# Patient Record
Sex: FEMALE | Race: WHITE | NOT HISPANIC OR LATINO | Employment: UNEMPLOYED | URBAN - METROPOLITAN AREA
[De-identification: names, ages, dates, MRNs, and addresses within clinical notes are randomized per-mention and may not be internally consistent; named-entity substitution may affect disease eponyms.]

---

## 2023-03-03 LAB
HSV 1 PCR QUAL, SKIN/MUCOSA: NOT DETECTED
HSV 2 PCR QUAL, SKIN/MUCOSA: NOT DETECTED
VARICELLA ZOSTER VIRUS PCR SKIN/MUCOSA: NOT DETECTED

## 2023-12-09 PROBLEM — F43.9 TRAUMA AND STRESSOR-RELATED DISORDER: Status: ACTIVE | Noted: 2023-12-09

## 2023-12-09 PROBLEM — R55 NEAR SYNCOPE: Status: ACTIVE | Noted: 2023-12-09

## 2023-12-09 PROBLEM — D18.01 HEMANGIOMA OF SKIN AND SUBCUTANEOUS TISSUE: Status: ACTIVE | Noted: 2023-03-24

## 2023-12-09 PROBLEM — G90.A POTS (POSTURAL ORTHOSTATIC TACHYCARDIA SYNDROME): Status: ACTIVE | Noted: 2023-12-09

## 2023-12-09 PROBLEM — F32.A DEPRESSION: Status: ACTIVE | Noted: 2023-12-09

## 2023-12-09 PROBLEM — H54.3 VISUAL LOSS, BILATERAL: Status: ACTIVE | Noted: 2023-12-09

## 2023-12-09 PROBLEM — R60.9 EDEMA: Status: ACTIVE | Noted: 2023-12-09

## 2023-12-09 PROBLEM — K58.9 IBS (IRRITABLE BOWEL SYNDROME): Status: ACTIVE | Noted: 2023-12-09

## 2023-12-09 PROBLEM — R00.2 PALPITATIONS: Status: ACTIVE | Noted: 2023-12-09

## 2023-12-09 PROBLEM — Q79.60 EDS (EHLERS-DANLOS SYNDROME) (HHS-HCC): Status: ACTIVE | Noted: 2023-12-09

## 2023-12-09 PROBLEM — G43.909 MIGRAINE, UNSPECIFIED, NOT INTRACTABLE, WITHOUT STATUS MIGRAINOSUS: Status: ACTIVE | Noted: 2023-12-09

## 2023-12-09 PROBLEM — L21.8 OTHER SEBORRHEIC DERMATITIS: Status: ACTIVE | Noted: 2023-03-24

## 2023-12-09 PROBLEM — D48.5 NEOPLASM OF UNCERTAIN BEHAVIOR OF SKIN: Status: ACTIVE | Noted: 2023-03-24

## 2023-12-09 PROBLEM — D22.39 MELANOCYTIC NEVI OF OTHER PARTS OF FACE: Status: ACTIVE | Noted: 2023-03-24

## 2023-12-09 PROBLEM — F12.90 USE OF CANNABIS: Status: ACTIVE | Noted: 2023-12-09

## 2023-12-09 PROBLEM — I95.1 ORTHOSTATIC INTOLERANCE: Status: ACTIVE | Noted: 2023-12-09

## 2023-12-09 PROBLEM — R10.2 PELVIC PAIN IN FEMALE: Status: ACTIVE | Noted: 2023-12-09

## 2023-12-09 PROBLEM — R42 DIZZINESS: Status: ACTIVE | Noted: 2023-12-09

## 2023-12-09 PROBLEM — D22.5 MELANOCYTIC NEVI OF TRUNK: Status: ACTIVE | Noted: 2023-03-24

## 2023-12-09 PROBLEM — M25.50 ARTHRALGIA OF MULTIPLE SITES: Status: ACTIVE | Noted: 2023-12-09

## 2023-12-09 PROBLEM — B00.9 HERPESVIRAL INFECTION, UNSPECIFIED: Status: ACTIVE | Noted: 2023-03-24

## 2023-12-09 PROBLEM — L71.0 PERIORAL DERMATITIS: Status: ACTIVE | Noted: 2023-03-24

## 2023-12-09 PROBLEM — G44.89 FOOD SENSITIVITY HEADACHE: Status: ACTIVE | Noted: 2023-12-09

## 2023-12-09 PROBLEM — Z91.018 FOOD ALLERGY: Status: ACTIVE | Noted: 2023-12-09

## 2023-12-09 PROBLEM — F41.9 ANXIETY: Status: ACTIVE | Noted: 2023-12-09

## 2023-12-09 PROBLEM — T78.1XXA GASTROINTESTINAL FOOD SENSITIVITY: Status: ACTIVE | Noted: 2023-12-09

## 2023-12-09 PROBLEM — F43.10 PTSD (POST-TRAUMATIC STRESS DISORDER): Status: ACTIVE | Noted: 2023-12-09

## 2023-12-09 PROBLEM — L57.8 OTHER SKIN CHANGES DUE TO CHRONIC EXPOSURE TO NONIONIZING RADIATION: Status: ACTIVE | Noted: 2023-03-24

## 2023-12-09 PROBLEM — N91.2 AMENORRHEA: Status: ACTIVE | Noted: 2023-12-09

## 2023-12-09 RX ORDER — ARIPIPRAZOLE ORAL 1 MG/ML
SOLUTION ORAL
COMMUNITY
Start: 2023-03-28 | End: 2023-12-18 | Stop reason: SDUPTHER

## 2023-12-18 ENCOUNTER — TELEMEDICINE (OUTPATIENT)
Dept: BEHAVIORAL HEALTH | Facility: CLINIC | Age: 33
End: 2023-12-18
Payer: COMMERCIAL

## 2023-12-18 DIAGNOSIS — F41.8 ANXIETY ASSOCIATED WITH DEPRESSION: ICD-10-CM

## 2023-12-18 DIAGNOSIS — F33.9 RECURRENT DEPRESSION (CMS-HCC): ICD-10-CM

## 2023-12-18 PROCEDURE — 99214 OFFICE O/P EST MOD 30 MIN: CPT

## 2023-12-18 RX ORDER — BUPROPION HYDROCHLORIDE 75 MG/1
75 TABLET ORAL DAILY
Qty: 90 TABLET | Refills: 3 | Status: SHIPPED | OUTPATIENT
Start: 2023-12-18 | End: 2024-02-20 | Stop reason: SDUPTHER

## 2023-12-18 RX ORDER — ARIPIPRAZOLE ORAL 1 MG/ML
5 SOLUTION ORAL DAILY
Qty: 450 ML | Refills: 3 | Status: SHIPPED | OUTPATIENT
Start: 2023-12-18 | End: 2024-02-20 | Stop reason: SDUPTHER

## 2023-12-18 NOTE — PROGRESS NOTES
Subjective   Patient ID: Reyes Win is a 33 y.o. female with past medical history of food and latex allergies, POTS, Yecenia-Danlos syndrome, and IBS , presenting today for follow up appointment for anxiety, depression, and trauma.     Chief Complain - Follow up     Patient provided 2 personal identifiers today prior to virtual appointment.    Not much going on today. Patient keeps busy with her children, two of whom are in school and Jg is at home with her.     Patient's  is ill today with a fever and vomiting. She is hoping that the family stays well.     Patient wasn't able to start Gabapentin due to issues with her compounding pharmacy but she reports that she is doing well currently and will hold off starting the medication.  Patient has to have her medication compounded since she is allergic to corn which causes her severe migraines.     Patient completed a TMS over the summer at [facility] in [place] and she reports that the treatment was effective for treating symptoms of depression and anxiety. She reports that the effects have lasted and she is very pleased with her mood and anxiety level. Her insurance covered the treatments and she had 40 treatments, Monday through Friday for 8 weeks. The treatments lasted 30 minutes each day.     Patient denying physical, medical, or mental health complaints today.     Review of Systems    Depressive Symptoms: not depressed or irritable, no loss of interest, no change in appetite, no recent lb weight gain, no recent lb weight loss, no insomnia, no fatigue or loss of energy, not feeling worthless or guilty, normal concentration, ability to make decisions, no suicidal ideation, no guns or weapons in household.   Manic Symptoms: mood is not irritable or elevated, self esteem is not grandiose or increased, no changes in need for sleep, not more talkative than usual, does not have flight of ideas or racing thoughts, no distractibility, no psychomotor  agitation or increased goal-directed activity, no excessive involvement in pleasurable activities.   Psychotic Symptoms: no hallucinations, no delusions, no disorganized speech, does not have disorganized behavior or catatonia, no negative symptoms.   Anxiety Symptoms: not having anxiety symptoms, no excessive worry, no difficulty controlling worry, (+) exposure to traumatic event, restlessness / not feeling on edge due to worry,  no panic attacks, no concerns about future panic attacks, no worry about panic attack consequences, no change in behavior due to panic attacks, no increase in arousal, not easily fatigued due to worry, no difficulty concentrating due to worry, no irritability due to worry, no muscle tension due to worry, no sleep disturbances due to worry, no specific phobia, no social phobia, no obsessions, no compulsions, no re-experiencing of traumatic event, no avoidance of stimuli and number of responsiveness.   Delirium/ Altered Mental Status Symptoms: no disturbances of consciousness, no diminished ability to focus, sustain, shift attention, no change in cognition or perceptual disturbances, symptoms do not fluctuate during the course of the day, general medical condition is not present.   Disordered Eating Symptoms: weight is not less than 85% of ideal body weight.   Post-traumatic stress disorder symptoms The patient is currently experiencing symptoms, emotional numbing, feeling detached, relationship problems, irritability, inability to concentrate.   Inattentive Symptoms: often has difficulty paying attention, often avoids/dislikes tasks with sustained mental effort, but does not make careless mistakes often, not often disorganized, does not lose things often, is not easily distracted, is not often forgetful, listens when spoken to directly, is able to follow instructions and finish schoolwork.   Other Symptoms/ Concerns: impulse control symptoms, personality disorder symptoms.   All other  "systems have been reviewed and are negative for complaint.     Constitutional: no sleep apnea, normal sleeping, no night wakings, no snoring, not a picky eater, normal appetite, no swallowing problems, no night terrors, no nightmares, no restless sleep, no snorts/gasps and no obesity.   Eyes: does not wear glasses/contacts.   ENT: no hearing loss.   Cardiovascular: no chest pain, no palpitations and no syncope . POTS Syndrome.   Respiratory: no wheezing and no asthma/reactive airway disease.   Gastrointestinal: no constipation, no abdominal pain, no nausea, no vomiting, no diarrhea, no blood in stools and no reflux.   Genitourinary: no nocturnal enuresis and no incontinence.   Musculoskeletal: myalgias and normal gait, but moving all extremities well and symmetrical, no arthritis or joint problems and no muscle weakness.   Neurological: headache, but no head injury, no seizures, no staring spells and no loss of consciousness.   Endocrine: no temperature intolerance.   Hematologic/Lymphatic: no anemia and no lead poisoning.   All other systems have been reviewed and are negative for complaint.     Mental Status Exam    General: Appropriately groomed and dressed, good eye contact  Appearance: Appears stated age   Attitude: Calm, cooperative   Behavior: Appropriate eye contact, attentive, engaged  Motor Activity: gait not assessed - virtual, no tics or tremors, no EPS/TD, no muscle atrophy.   Speech: Regular rate, rhythm, volume and tone, spontaneous, fluent.   Mood: \"good \".   Affect: Euthymic .   Thought Process: Organized, linear, goal directed. Associations are logical.   Thought Content: Does not endorse suicidal or homicidal ideation, no delusions elicited.   Thought Perception: Does not endorse auditory or visual hallucinations, does not appear to be responding to hallucinatory stimuli.   Cognition: Alert, oriented x3. No deficits noted. Adequate fund of knowledge. No deficit in recent and remote memory. No " deficits in attention, concentration or language.    Insight: Good, as patient recognizes symptoms of illness and need for recommended treatments.   Judgment: Can make reasonable decisions about ordinary activities of daily living and necessary medical care recommendations.         Lab Review:   No visits with results within 6 Month(s) from this visit.   Latest known visit with results is:   Orders Only on 03/03/2023   Component Date Value    Herpes simplex virus 1 P* 03/03/2023 NOT DETECTED     Herpes simplex virus 2 P* 03/03/2023 NOT DETECTED        Assessment/Plan   Safety Assessment: no current (+) past SI, (+) SA, no guns. RF include impulsivity, history of self-harm and SA, , pain, depression, chronic illness PF include children, future focused, hopeful. moderate risk risk    Diagnoses and all orders for this visit: Patient looks great today, her symptoms have improved markedly with TMS. Patient affect is bright and she is future focused. She is denying any mental health symptoms. Will continue current medications. No labs need to be ordered today.    Greater than 50% of today's appointment consisted of supportive therapy, counseling, psych-education, and care coordination .   Anxiety associated with depression  Recurrent depression (CMS/HCC)  -     buPROPion (Wellbutrin) 75 mg tablet; Take 1 tablet (75 mg) by mouth once daily.  -     ARIPiprazole (Abilify) 1 mg/mL solution; Take 5 mL (5 mg) by mouth once daily.    Follow up in 3 months     Time     2 minutes chart review  20 minutes direct patient contact  10 minutes charting     Total Time 32 minutes

## 2023-12-21 ENCOUNTER — OFFICE VISIT (OUTPATIENT)
Dept: PRIMARY CARE | Facility: CLINIC | Age: 33
End: 2023-12-21
Payer: COMMERCIAL

## 2023-12-21 VITALS
DIASTOLIC BLOOD PRESSURE: 76 MMHG | WEIGHT: 142.6 LBS | HEIGHT: 69 IN | SYSTOLIC BLOOD PRESSURE: 112 MMHG | BODY MASS INDEX: 21.12 KG/M2 | HEART RATE: 67 BPM

## 2023-12-21 DIAGNOSIS — R53.82 CHRONIC FATIGUE: Primary | ICD-10-CM

## 2023-12-21 LAB
25(OH)D3 SERPL-MCNC: 18 NG/ML (ref 30–100)
ALBUMIN SERPL BCP-MCNC: 4.5 G/DL (ref 3.4–5)
ALP SERPL-CCNC: 58 U/L (ref 33–110)
ALT SERPL W P-5'-P-CCNC: 11 U/L (ref 7–45)
ANION GAP SERPL CALC-SCNC: 14 MMOL/L (ref 10–20)
AST SERPL W P-5'-P-CCNC: 15 U/L (ref 9–39)
BILIRUB SERPL-MCNC: 0.4 MG/DL (ref 0–1.2)
BUN SERPL-MCNC: 11 MG/DL (ref 6–23)
CALCIUM SERPL-MCNC: 10.1 MG/DL (ref 8.6–10.6)
CHLORIDE SERPL-SCNC: 104 MMOL/L (ref 98–107)
CO2 SERPL-SCNC: 29 MMOL/L (ref 21–32)
CREAT SERPL-MCNC: 0.68 MG/DL (ref 0.5–1.05)
FERRITIN SERPL-MCNC: 37 NG/ML (ref 8–150)
GFR SERPL CREATININE-BSD FRML MDRD: >90 ML/MIN/1.73M*2
GLUCOSE SERPL-MCNC: 87 MG/DL (ref 74–99)
IRON SATN MFR SERPL: 33 % (ref 25–45)
IRON SERPL-MCNC: 106 UG/DL (ref 35–150)
POTASSIUM SERPL-SCNC: 4.1 MMOL/L (ref 3.5–5.3)
PROT SERPL-MCNC: 6.9 G/DL (ref 6.4–8.2)
SODIUM SERPL-SCNC: 143 MMOL/L (ref 136–145)
TIBC SERPL-MCNC: 320 UG/DL (ref 240–445)
TRANSFERRIN SERPL-MCNC: 234 MG/DL (ref 200–360)
TSH SERPL-ACNC: 0.93 MIU/L (ref 0.44–3.98)
UIBC SERPL-MCNC: 214 UG/DL (ref 110–370)
VIT B12 SERPL-MCNC: 391 PG/ML (ref 211–911)

## 2023-12-21 PROCEDURE — 1036F TOBACCO NON-USER: CPT | Performed by: NURSE PRACTITIONER

## 2023-12-21 PROCEDURE — 84466 ASSAY OF TRANSFERRIN: CPT

## 2023-12-21 PROCEDURE — 36415 COLL VENOUS BLD VENIPUNCTURE: CPT

## 2023-12-21 PROCEDURE — 80053 COMPREHEN METABOLIC PANEL: CPT

## 2023-12-21 PROCEDURE — 83550 IRON BINDING TEST: CPT

## 2023-12-21 PROCEDURE — 83540 ASSAY OF IRON: CPT

## 2023-12-21 PROCEDURE — 82607 VITAMIN B-12: CPT

## 2023-12-21 PROCEDURE — 84443 ASSAY THYROID STIM HORMONE: CPT

## 2023-12-21 PROCEDURE — 82306 VITAMIN D 25 HYDROXY: CPT

## 2023-12-21 PROCEDURE — 82728 ASSAY OF FERRITIN: CPT

## 2023-12-21 PROCEDURE — 99395 PREV VISIT EST AGE 18-39: CPT | Performed by: NURSE PRACTITIONER

## 2023-12-21 RX ORDER — MULTIVIT WITH MINERALS/HERBS
1 TABLET ORAL DAILY
COMMUNITY

## 2023-12-21 NOTE — PROGRESS NOTES
Reyes Dawson is a 33 y.o. female who is presenting for annual physical exam.     Last  Visit: Unknown  Reported Health: Fair    Dental, Vision, Hearing:  Regular dental visits: yes  - Brushes teeth 2 times per day  - Flosses? yes  Vision problems: yes  - Wears glasses or contacts? yes, glasses but does not wear  - Last eye exam:  2 years ago  Hearing loss: no    Immunization status:  Up to date: no    Lifestyle:  Healthy diet: yes  Regular exercise: yes  - Exercise frequency: 2 times per week  - Type of exercise: weights, cardio  Alcohol: no  Tobacco: no  Drugs: marijuana     Reproductive Health:  Menstrual problems: no  - LMP:  23  Sexually active: yes, 1 male parter  Contraception: tubal     Pregnancy History:   : 4  Parity: 3  - Full term: 3  - Premature:  - (s):  - Living:  - AB Induced:  - AB Spont: 1  - Ectopic:   - Multiple births:    Cervical Cancer Screening:  Last pap:    History of abnormal pap smear? no  Breast Cancer Screening:  Last mammogram:  n/a  Colorectal Cancer Screening:  Last colonoscopy or Cologuard:  n/a  Metabolic Screening:  Lipid profile: 2022  Glucose screen:     Review of Systems  Gen: fatigue. denies fever, chills, weight loss  HEENT: denies sinus pressure, sinus congestion, runny nose, red eyes, itchy eyes, vision loss, ear pain, hearing loss, throat pain, trouble swallowing  Neck: denies neck pain, neck swelling or masses  Chest/breast: denies breast pain, breast lumps, nipple discharge  CV: denies chest pain, palpitations, fast heart rate, syncope  Resp: denies shortness of breath, cough, wheezing  GI: hemorrhoids. denies abdominal pain, nausea, diarrhea, constipation, hematochezia, melena  : denies dysuria, hematuria, vaginal discharge, frequency  Endo: denies polydipsia, polyuria, heat/cold intolerance, weight change, hair thinning  Heme: denies easy bruising, easy bleeding  Neuro: chronic headaches. Chronic dizziness. denies numbness,  tingling, memory loss, changes in vision  MSK: denies joint pain, joint swelling, weakness  Psych: depression and anxiety. denies suicidal ideation, homicidal ideation, mood swings  Skin: denies rashes, abnormal lesions, itching, changes in moles     Previous History  Past Medical History:   Diagnosis Date    29 weeks gestation of pregnancy 02/04/2019    29 weeks gestation of pregnancy    36 weeks gestation of pregnancy 03/25/2019    36 weeks gestation of pregnancy    Diseases of the circulatory system complicating pregnancy, unspecified trimester (CODE) 02/04/2019    Pregnancy complicated by cardiac condition, antepartum    Encounter for insertion of intrauterine contraceptive device 05/16/2019    Encounter for insertion of intrauterine contraceptive device (IUD)    Encounter for pregnancy test, result positive 09/27/2018    Positive pregnancy test    Encounter for prophylactic Rho(d) immune globulin 02/04/2019    Need for rhogam due to Rh negative mother    Encounter for screening for infections with a predominantly sexual mode of transmission 01/26/2017    Screen for STD (sexually transmitted disease)    Encounter for screening for infections with a predominantly sexual mode of transmission 09/22/2016    Screen for STD (sexually transmitted disease)    Encounter for supervision of normal pregnancy, unspecified, second trimester 01/10/2019    Second trimester pregnancy    Encounter for supervision of normal pregnancy, unspecified, third trimester 12/17/2020    Third trimester pregnancy    Encounter for supervision of normal pregnancy, unspecified, unspecified trimester 09/20/2018    Pregnancy with uncertain dates    Maternal care for (suspected) hereditary disease in fetus, not applicable or unspecified 03/25/2019    Hereditary disease in family possibly affecting pregnancy, antepartum    Other mental disorders complicating pregnancy, unspecified trimester 03/25/2019    Depression affecting pregnancy, antepartum     Other specified health status 04/16/2015    Known health problems: none    Personal history of other diseases of the female genital tract     Personal history of amenorrhea    Personal history of other mental and behavioral disorders 11/08/2022    History of suicidal ideation     Past Surgical History:   Procedure Laterality Date    OTHER SURGICAL HISTORY  02/25/2014    [facility] Intrauterine Device (IUD) Insertion    OTHER SURGICAL HISTORY  02/25/2014    Dental Surgery    OTHER SURGICAL HISTORY  06/18/2021    Tubal ligation     Social History     Tobacco Use    Smoking status: Never    Smokeless tobacco: Never   Substance Use Topics    Alcohol use: Not Currently    Drug use: Never     Family History   Problem Relation Name Age of Onset    No Known Problems Mother      Diabetes Maternal Grandmother      Fibromyalgia Maternal Grandmother      Hyperlipidemia Maternal Grandmother      Other (tachycardia) Maternal Grandmother      Diabetes Maternal Grandfather      Hyperlipidemia Maternal Grandfather      Kidney disease Paternal Grandfather       Allergies   Allergen Reactions    Shellfish Containing Products Anaphylaxis    Codeine Unknown    Corn Other     Headache Evaluate products for starch, lactose, or riboflavin. IV solutions should be compounded in NS (avoid D5W)    Iodine Unknown    Latex Hives    Milk Containing Products (Dairy) Unknown    Peanut Unknown    Soy Other     nausea    Wheat Other     Gi issues     Current Outpatient Medications   Medication Instructions    ARIPiprazole (ABILIFY) 5 mg, oral, Daily    buPROPion (WELLBUTRIN) 75 mg, oral, Daily       Physical Exam  General: Alert and oriented, in no acute distress. Appears stated age, well-nourished, and well hydrated  HEENT:  - Head: Normocephalic and atraumatic   - Eyes: EOMI, PERRLA  - ENT: Hearing grossly intact. Mucus membranes pink and moist without lesions. Tonsils present without swelling or exudates. Good dentition. TMs  gray  Neck: Supple. No stiffness. No thyromegaly or thyroid nodules  Heart: RRR. No murmurs, clicks, or rubs  Lungs: Unlabored breathing. CTAB with no crackles, wheezes, or rhonchi  Abdomen: Normal BS in all 4 quadrants. Soft, non-tender, non-distended, with no masses  Extremities: Warm and well perfused. No edema. Normal peripheral pulses  Musculoskeletal: ROM intact. Strength 5/5 in BUE and BLE. No joint swelling. Normal gait and station  Neurological: Alert and oriented. No gross neurological deficits. Normal sensation. No weakness. DTRs +2/4   Psychological: Appropriate mood and affect  Skin: No rash, abnormal lesions, cyanosis, or erythema      Assessment and Plan     Annual Physical  - Due for flu, will need to get at pharmacy due to latex allergy  - Labs: CBCd, iron studies, vit D, vit B12, TSH, CMP  - Declined STI screening  - Maintain healthy lifestyle    RTC in 1 year for physical or sooner BRIDGETTE Solorio-CNP  [facility]

## 2023-12-22 ENCOUNTER — TELEPHONE (OUTPATIENT)
Dept: PRIMARY CARE | Facility: CLINIC | Age: 33
End: 2023-12-22
Payer: COMMERCIAL

## 2023-12-22 DIAGNOSIS — E55.9 VITAMIN D DEFICIENCY: Primary | ICD-10-CM

## 2023-12-22 RX ORDER — ERGOCALCIFEROL 1.25 MG/1
50000 CAPSULE ORAL
Qty: 12 CAPSULE | Refills: 0 | Status: SHIPPED | OUTPATIENT
Start: 2023-12-22 | End: 2023-12-28 | Stop reason: SDUPTHER

## 2023-12-28 ENCOUNTER — TELEPHONE (OUTPATIENT)
Dept: PRIMARY CARE | Facility: CLINIC | Age: 33
End: 2023-12-28

## 2023-12-28 ENCOUNTER — CLINICAL SUPPORT (OUTPATIENT)
Dept: PRIMARY CARE | Facility: CLINIC | Age: 33
End: 2023-12-28
Payer: COMMERCIAL

## 2023-12-28 DIAGNOSIS — R53.82 CHRONIC FATIGUE: ICD-10-CM

## 2023-12-28 DIAGNOSIS — E55.9 VITAMIN D DEFICIENCY: ICD-10-CM

## 2023-12-28 LAB
BASOPHILS # BLD AUTO: 0.04 X10*3/UL (ref 0–0.1)
BASOPHILS NFR BLD AUTO: 0.8 %
EOSINOPHIL # BLD AUTO: 0.07 X10*3/UL (ref 0–0.7)
EOSINOPHIL NFR BLD AUTO: 1.4 %
ERYTHROCYTE [DISTWIDTH] IN BLOOD BY AUTOMATED COUNT: 12.4 % (ref 11.5–14.5)
HCT VFR BLD AUTO: 43.3 % (ref 36–46)
HGB BLD-MCNC: 13.9 G/DL (ref 12–16)
IMM GRANULOCYTES # BLD AUTO: 0.01 X10*3/UL (ref 0–0.7)
IMM GRANULOCYTES NFR BLD AUTO: 0.2 % (ref 0–0.9)
LYMPHOCYTES # BLD AUTO: 1.97 X10*3/UL (ref 1.2–4.8)
LYMPHOCYTES NFR BLD AUTO: 39.9 %
MCH RBC QN AUTO: 30.5 PG (ref 26–34)
MCHC RBC AUTO-ENTMCNC: 32.1 G/DL (ref 32–36)
MCV RBC AUTO: 95 FL (ref 80–100)
MONOCYTES # BLD AUTO: 0.38 X10*3/UL (ref 0.1–1)
MONOCYTES NFR BLD AUTO: 7.7 %
NEUTROPHILS # BLD AUTO: 2.47 X10*3/UL (ref 1.2–7.7)
NEUTROPHILS NFR BLD AUTO: 50 %
NRBC BLD-RTO: 0 /100 WBCS (ref 0–0)
PLATELET # BLD AUTO: 243 X10*3/UL (ref 150–450)
RBC # BLD AUTO: 4.56 X10*6/UL (ref 4–5.2)
WBC # BLD AUTO: 4.9 X10*3/UL (ref 4.4–11.3)

## 2023-12-28 PROCEDURE — 85025 COMPLETE CBC W/AUTO DIFF WBC: CPT

## 2023-12-28 PROCEDURE — 36415 COLL VENOUS BLD VENIPUNCTURE: CPT

## 2023-12-28 RX ORDER — ERGOCALCIFEROL 1.25 MG/1
50000 CAPSULE ORAL
Qty: 12 CAPSULE | Refills: 0 | Status: SHIPPED | OUTPATIENT
Start: 2023-12-28 | End: 2024-03-21

## 2023-12-28 NOTE — TELEPHONE ENCOUNTER
Patient states her vitamin D was sent to a compound pharmacy. Patient requesting the Rx be called into her local pharmacy [facility]/ [place].

## 2024-01-08 NOTE — PATIENT INSTRUCTIONS
1. Reviewed diagnostic impression including subjective and objective data and provided education about anxiety disorder depression and PTSD , etiology, treatment recommendations including medication, therapy, course of treatment and prognosis. Patient amendable to treatment plan.     2. Continue therapy. You are doing well, no need to start the Gabapentin, will DC.      Recommendations  CONTINUE : 75 mg Bupropion -= take 1 tablet in the morning daily for depression  CONTINUE 5 ml Abilify - take once daily for mood stabilization (5 ml or 1 mg/ml).     3. Reviewed r/b/a, possible side effects of the medication(s). Client is aware benefit/risks      4. Labs reviewed and discussed     5. Plan for supportive psychotherapy     6. Follow up with physical health providers as scheduled     7. Follow up in 3 months      May follow up sooner if experiences worsening symptoms by calling  Psychiatry at [phone]      Patient verbalized an understanding to call [facility] at [phone] ([facility]), [phone], or [phone]/go to the nearest emergency room if experiences thoughts of harm to self or others.

## 2024-02-20 DIAGNOSIS — F33.9 RECURRENT DEPRESSION (CMS-HCC): ICD-10-CM

## 2024-02-20 RX ORDER — ARIPIPRAZOLE ORAL 1 MG/ML
5 SOLUTION ORAL DAILY
Qty: 450 ML | Refills: 3 | Status: SHIPPED | OUTPATIENT
Start: 2024-02-20 | End: 2025-02-14

## 2024-02-20 RX ORDER — BUPROPION HYDROCHLORIDE 75 MG/1
75 TABLET ORAL DAILY
Qty: 90 TABLET | Refills: 3 | Status: SHIPPED | OUTPATIENT
Start: 2024-02-20 | End: 2025-02-19

## 2024-03-13 ENCOUNTER — OFFICE VISIT (OUTPATIENT)
Dept: DERMATOLOGY | Facility: CLINIC | Age: 34
End: 2024-03-13
Payer: COMMERCIAL

## 2024-03-13 DIAGNOSIS — D18.01 HEMANGIOMA OF SKIN: ICD-10-CM

## 2024-03-13 DIAGNOSIS — D22.72 MELANOCYTIC NEVI OF LEFT LOWER EXTREMITY OR HIP: ICD-10-CM

## 2024-03-13 DIAGNOSIS — L57.8 PHOTOAGING OF SKIN: ICD-10-CM

## 2024-03-13 DIAGNOSIS — D22.5 MELANOCYTIC NEVI OF TRUNK: ICD-10-CM

## 2024-03-13 DIAGNOSIS — D22.71 MELANOCYTIC NEVI OF RIGHT LOWER LIMB, INCLUDING HIP: ICD-10-CM

## 2024-03-13 DIAGNOSIS — L71.0 PERIORAL DERMATITIS: ICD-10-CM

## 2024-03-13 DIAGNOSIS — D22.60 MELANOCYTIC NEVI OF UNSPECIFIED UPPER LIMB, INCLUDING SHOULDER: ICD-10-CM

## 2024-03-13 DIAGNOSIS — L21.9 SEBORRHEIC DERMATITIS: ICD-10-CM

## 2024-03-13 DIAGNOSIS — Z12.83 ENCOUNTER FOR SCREENING FOR MALIGNANT NEOPLASM OF SKIN: Primary | ICD-10-CM

## 2024-03-13 PROCEDURE — 99214 OFFICE O/P EST MOD 30 MIN: CPT | Performed by: DERMATOLOGY

## 2024-03-13 PROCEDURE — 1036F TOBACCO NON-USER: CPT | Performed by: DERMATOLOGY

## 2024-03-13 RX ORDER — MUPIROCIN 20 MG/G
OINTMENT TOPICAL
Qty: 22 G | Refills: 3 | Status: SHIPPED | OUTPATIENT
Start: 2024-03-13

## 2024-03-13 RX ORDER — KETOCONAZOLE 20 MG/ML
SHAMPOO, SUSPENSION TOPICAL
Qty: 120 ML | Refills: 11 | Status: SHIPPED | OUTPATIENT
Start: 2024-03-13

## 2024-03-13 ASSESSMENT — DERMATOLOGY PATIENT ASSESSMENT
ARE YOU AN ORGAN TRANSPLANT RECIPIENT: NO
ARE YOU ON BIRTH CONTROL: NO
HAVE YOU HAD OR DO YOU HAVE VASCULAR DISEASE: NO
ARE YOU TRYING TO GET PREGNANT: NO
DO YOU HAVE ANY NEW OR CHANGING LESIONS: NO
HAVE YOU HAD OR DO YOU HAVE A STAPH INFECTION: NO
DO YOU USE SUNSCREEN: OCCASIONALLY
FOR PATIENTS COMING IN FOR A FOLLOW-UP VISIT - HAVE THERE BEEN ANY CHANGES IN YOUR HEALTH SINCE YOUR LAST VISIT: NO
DO YOU HAVE IRREGULAR MENSTRUAL CYCLES: NO
DO YOU USE A TANNING BED: NO

## 2024-03-13 ASSESSMENT — DERMATOLOGY QUALITY OF LIFE (QOL) ASSESSMENT
RATE HOW BOTHERED YOU ARE BY EFFECTS OF YOUR SKIN PROBLEMS ON YOUR ACTIVITIES (EG, GOING OUT, ACCOMPLISHING WHAT YOU WANT, WORK ACTIVITIES OR YOUR RELATIONSHIPS WITH OTHERS): 0 - NEVER BOTHERED
RATE HOW BOTHERED YOU ARE BY SYMPTOMS OF YOUR SKIN PROBLEM (EG, ITCHING, STINGING BURNING, HURTING OR SKIN IRRITATION): 0 - NEVER BOTHERED
ARE THERE EXCLUSIONS OR EXCEPTIONS FOR THE QUALITY OF LIFE ASSESSMENT: NO
WHAT SINGLE SKIN CONDITION LISTED BELOW IS THE PATIENT ANSWERING THE QUALITY-OF-LIFE ASSESSMENT QUESTIONS ABOUT: NONE OF THE ABOVE
RATE HOW EMOTIONALLY BOTHERED YOU ARE BY YOUR SKIN PROBLEM (FOR EXAMPLE, WORRY, EMBARRASSMENT, FRUSTRATION): 0 - NEVER BOTHERED
DATE THE QUALITY-OF-LIFE ASSESSMENT WAS COMPLETED: 66912

## 2024-03-13 ASSESSMENT — ITCH NUMERIC RATING SCALE: HOW SEVERE IS YOUR ITCHING?: 0

## 2024-03-13 ASSESSMENT — PATIENT GLOBAL ASSESSMENT (PGA): PATIENT GLOBAL ASSESSMENT: PATIENT GLOBAL ASSESSMENT:  1 - CLEAR

## 2024-03-13 NOTE — PROGRESS NOTES
Subjective     Reyes Dawson is a 33 y.o. female who presents for the following: Skin Check (Pt here for FBSE with family hx of NMSC - Mother. Pt reports no areas of concern at this time. ).     Review of Systems:  No other skin or systemic complaints other than what is documented elsewhere in the note.    The following portions of the chart were reviewed this encounter and updated as appropriate:         Skin Cancer History  No skin cancer on file.      Specialty Problems          Dermatology Problems    Hemangioma of skin and subcutaneous tissue    Melanocytic nevi of other parts of face    Melanocytic nevi of trunk    Neoplasm of uncertain behavior of skin    Other seborrheic dermatitis    Other skin changes due to chronic exposure to nonionizing radiation    Perioral dermatitis        Objective   Well appearing patient in no apparent distress; mood and affect are within normal limits.    A full examination was performed including scalp, head, eyes, ears, nose, lips, neck, chest, axillae, abdomen, back, buttocks, bilateral upper extremities, bilateral lower extremities, hands, feet, fingers, toes, fingernails, and toenails. All findings within normal limits unless otherwise noted below.    Assessment/Plan   1. Encounter for screening for malignant neoplasm of skin  No suspicious lesions noted on examination today    The risk of chronic, cumulative sun damage and risk of development of skin cancer was reviewed today.   The importance of sun protection was reviewed: including the use of a broad spectrum sunscreen that protects against both UVA/UVB rays, with ingredients such as Zinc oxide or titanium dioxide, wearing sun protective clothing and sun avoidance. We reviewed the warning signs of non-melanoma skin cancer and ABCDEs of melanoma  Please follow up should you notice any new or changing pre-existing skin lesion.    Related Procedures  Follow Up In Dermatology - Established Patient    2. Photoaging of  skin  Mottled pigmentation with telangiectasias and brown reticular macules in sun exposed areas of the body.    The risk of chronic, cumulative sun damage and risk of development of skin cancer was reviewed today.   The importance of sun protection was reviewed: including the use of a broad spectrum sunscreen that protects against both UVA/UVB rays, with ingredients such as Zinc oxide or titanium dioxide, wearing sun protective clothing and sun avoidance. We reviewed the warning signs of non-melanoma skin cancer and ABCDEs of melanoma  Please follow up should you notice any new or changing pre-existing skin lesion.    Related Procedures  Follow Up In Dermatology - Established Patient    3. Seborrheic dermatitis  Scalp  Clear today    The chronic and intermittently flaring nature of this skin condition was discussed with patient today.   This is due to a yeast that everyone has on their skin that results in redness, dryness and in some patients itching.    Various treatment options were reviewed including topical antifungals and topical steroids depending upon severity and symptoms. Patient advised we cannot cure this condition, but it can be controlled.     Continue ketoconazole 2% shampoo, lather for 3-5 minutes every other day then rinse.    ketoconazole (NIZOral) 2 % shampoo - Scalp  Lather for 3-5 minutes every other day then rinse    4. Perioral dermatitis  Left Upper Cutaneous Lip, Right Upper Cutaneous Lip  Slight erythema today    The chronic and intermittently flaring nature of the skin condition was discussed with the patient today. Discussed common triggers associated with perioral dermatitis including fluoride treatment, cinnamon gum and/or mints, inhaled or oral corticosteroids. Various treatment options discussed and risks and benefits of each.   Patient does well with mupirocin ointment and requesting refill today.    If not responding or worsening she was previously on oral doxycycline which was  helpful and resulted in clearance of rash advised she can call for refills.      Related Medications  mupirocin (Bactroban) 2 % ointment  Apply to the face 2x daily as needed for flaring    5. Melanocytic nevi of trunk (2)  Abdomen (Lower Torso, Anterior), Torso - Posterior (Back)  Tan-brown symmetric macules and papules    Clinically benign appearing nevi, no treatment is necessary.  The importance of sun protection was reviewed: including the use of a broad spectrum sunscreen that protects against both UVA/UVB rays, with ingredients such as Zinc oxide or titanium dioxide, wearing sun protective clothing and sun avoidance.   ABCDEs of melanoma reviewed.  Please follow up should you notice any new or changing pre-existing skin lesion.    6. Melanocytic nevi of unspecified upper limb, including shoulder (2)  Left Arm, Right Arm  Scattered, uniform and benign-appearing, regular brown melanocytic papules and macules.    Clinically benign appearing nevi, no treatment is necessary.  The importance of sun protection was reviewed: including the use of a broad spectrum sunscreen that protects against both UVA/UVB rays, with ingredients such as Zinc oxide or titanium dioxide, wearing sun protective clothing and sun avoidance.   ABCDEs of melanoma reviewed.  Please follow up should you notice any new or changing pre-existing skin lesion.    7. Melanocytic nevi of left lower extremity or hip  Left Leg  Scattered, uniform and benign-appearing, regular brown melanocytic papules and macules.    Clinically benign appearing nevi, no treatment is necessary.  The importance of sun protection was reviewed: including the use of a broad spectrum sunscreen that protects against both UVA/UVB rays, with ingredients such as Zinc oxide or titanium dioxide, wearing sun protective clothing and sun avoidance.   ABCDEs of melanoma reviewed.  Please follow up should you notice any new or changing pre-existing skin lesion.    8. Melanocytic nevi  of right lower limb, including hip  Right Leg  Scattered, uniform and benign-appearing, regular brown melanocytic papules and macules.    Clinically benign appearing nevi, no treatment is necessary.  The importance of sun protection was reviewed: including the use of a broad spectrum sunscreen that protects against both UVA/UVB rays, with ingredients such as Zinc oxide or titanium dioxide, wearing sun protective clothing and sun avoidance.   ABCDEs of melanoma reviewed.  Please follow up should you notice any new or changing pre-existing skin lesion.    9. Hemangioma of skin  Cherry red papules    The benign nature of these skin lesions were reviewed, no treatment is necessary.   Please follow up for any new or pre-existing lesion that is changing in size, shape, color, becomes painful, tender, itches or bleed.    Follow up yearly.

## 2024-06-16 ENCOUNTER — APPOINTMENT (OUTPATIENT)
Dept: BEHAVIORAL HEALTH | Facility: CLINIC | Age: 34
End: 2024-06-16
Payer: COMMERCIAL

## 2024-06-16 DIAGNOSIS — F33.9 RECURRENT DEPRESSION (CMS-HCC): ICD-10-CM

## 2024-06-16 DIAGNOSIS — F41.9 ANXIETY: ICD-10-CM

## 2024-06-16 PROCEDURE — 1036F TOBACCO NON-USER: CPT

## 2024-06-16 PROCEDURE — 99214 OFFICE O/P EST MOD 30 MIN: CPT

## 2024-06-16 NOTE — PATIENT INSTRUCTIONS
1. Reviewed diagnostic impression including subjective and objective data and provided education about anxiety disorder depression and PTSD , etiology, treatment recommendations including medication, therapy, course of treatment and prognosis. Patient amendable to treatment plan.     2. Continue therapy. You look great, continue current medications and enjoy the summer      Recommendations  CONTINUE : 75 mg Bupropion -= take 1 tablet in the morning daily for depression  CONTINUE 5 ml Abilify - take once daily for mood stabilization (5 ml or 1 mg/ml).     3. Reviewed r/b/a, possible side effects of the medication(s). Client is aware benefit/risks      4. Labs reviewed and discussed     5. Plan for supportive psychotherapy     6. Follow up with physical health providers as scheduled     7. Follow up in 3 months      May follow up sooner if experiences worsening symptoms by calling  Psychiatry at [phone]      Patient verbalized an understanding to call [facility] at [phone] ([facility]), [phone], or [phone]/go to the nearest emergency room if experiences thoughts of harm to self or others.

## 2024-06-16 NOTE — PROGRESS NOTES
Subjective   Patient ID: Reyes Win is a 33 y.o. female with past medical history of food and latex allergies, POTS, Yecenia-Danlos syndrome, and IBS , presenting today for follow up appointment for anxiety, depression, and trauma.     Chief Complain - Follow up     Patient provided 2 personal identifiers today prior to virtual appointment.    Patient is keeping busy with the kids. They are out of school so they are enjoying their summer.     Oldest son (Raz) is 8 and playing baseball.     Jose is 5 - had her first ballet recital    Youngest is 3 (Jg)    Patient reports that she is doing well on her  medications.      She had a down period and considered having a few TMS sessions but she is feeling OK for now.     Sleeping OK     Appetite is up a bit but no a concern.    HASSAN is bothering her a bit in the  Summer months.     Anxiety is in a good place. No panic attacks.     Review of Systems    Depressive Symptoms: not depressed or irritable, no loss of interest, no change in appetite, no recent lb weight gain, no recent lb weight loss, no insomnia, no fatigue or loss of energy, not feeling worthless or guilty, normal concentration, ability to make decisions, no suicidal ideation, no guns or weapons in household.   Manic Symptoms: mood is not irritable or elevated, self esteem is not grandiose or increased, no changes in need for sleep, not more talkative than usual, does not have flight of ideas or racing thoughts, no distractibility, no psychomotor agitation or increased goal-directed activity, no excessive involvement in pleasurable activities.   Psychotic Symptoms: no hallucinations, no delusions, no disorganized speech, does not have disorganized behavior or catatonia, no negative symptoms.   Anxiety Symptoms: not having anxiety symptoms, no excessive worry, no difficulty controlling worry, (+) exposure to traumatic event, restlessness / not feeling on edge due to worry,  no panic attacks, no  concerns about future panic attacks, no worry about panic attack consequences, no change in behavior due to panic attacks, no increase in arousal, not easily fatigued due to worry, no difficulty concentrating due to worry, no irritability due to worry, no muscle tension due to worry, no sleep disturbances due to worry, no specific phobia, no social phobia, no obsessions, no compulsions, no re-experiencing of traumatic event, no avoidance of stimuli and number of responsiveness.   Delirium/ Altered Mental Status Symptoms: no disturbances of consciousness, no diminished ability to focus, sustain, shift attention, no change in cognition or perceptual disturbances, symptoms do not fluctuate during the course of the day, general medical condition is not present.   Disordered Eating Symptoms: weight is not less than 85% of ideal body weight.   Post-traumatic stress disorder symptoms The patient is currently experiencing symptoms, emotional numbing, feeling detached, relationship problems, irritability, inability to concentrate.   Inattentive Symptoms: often has difficulty paying attention, often avoids/dislikes tasks with sustained mental effort, but does not make careless mistakes often, not often disorganized, does not lose things often, is not easily distracted, is not often forgetful, listens when spoken to directly, is able to follow instructions and finish schoolwork.   Other Symptoms/ Concerns: impulse control symptoms, personality disorder symptoms.   All other systems have been reviewed and are negative for complaint.     Constitutional: no sleep apnea, normal sleeping, no night wakings, no snoring, not a picky eater, normal appetite, no swallowing problems, no night terrors, no nightmares, no restless sleep, no snorts/gasps and no obesity.   Eyes: does not wear glasses/contacts.   ENT: no hearing loss.   Cardiovascular: no chest pain, no palpitations and no syncope . POTS Syndrome.   Respiratory: no wheezing and  "no asthma/reactive airway disease.   Gastrointestinal: no constipation, no abdominal pain, no nausea, no vomiting, no diarrhea, no blood in stools and no reflux.   Genitourinary: no nocturnal enuresis and no incontinence.   Musculoskeletal: myalgias and normal gait, but moving all extremities well and symmetrical, no arthritis or joint problems and no muscle weakness.   Neurological: headache, but no head injury, no seizures, no staring spells and no loss of consciousness.   Endocrine: no temperature intolerance.   Hematologic/Lymphatic: no anemia and no lead poisoning.   All other systems have been reviewed and are negative for complaint.     Mental Status Exam    General: Appropriately groomed and dressed, good eye contact  Appearance: Appears stated age   Attitude: Calm, cooperative   Behavior: Appropriate eye contact, attentive, engaged  Motor Activity: gait not assessed - virtual, no tics or tremors, no EPS/TD, no muscle atrophy.   Speech: Regular rate, rhythm, volume and tone, spontaneous, fluent.   Mood: \"good \".   Affect: Euthymic .   Thought Process: Organized, linear, goal directed. Associations are logical.   Thought Content: Does not endorse suicidal or homicidal ideation, no delusions elicited.   Thought Perception: Does not endorse auditory or visual hallucinations, does not appear to be responding to hallucinatory stimuli.   Cognition: Alert, oriented x3. No deficits noted. Adequate fund of knowledge. No deficit in recent and remote memory. No deficits in attention, concentration or language.    Insight: Good, as patient recognizes symptoms of illness and need for recommended treatments.   Judgment: Can make reasonable decisions about ordinary activities of daily living and necessary medical care recommendations.         Lab Review:   Clinical Support on 12/28/2023   Component Date Value    WBC 12/28/2023 4.9     nRBC 12/28/2023 0.0     RBC 12/28/2023 4.56     Hemoglobin 12/28/2023 13.9     Hematocrit " 12/28/2023 43.3     MCV 12/28/2023 95     MCH 12/28/2023 30.5     MCHC 12/28/2023 32.1     RDW 12/28/2023 12.4     Platelets 12/28/2023 243     Neutrophils % 12/28/2023 50.0     Immature Granulocytes %,* 12/28/2023 0.2     Lymphocytes % 12/28/2023 39.9     Monocytes % 12/28/2023 7.7     Eosinophils % 12/28/2023 1.4     Basophils % 12/28/2023 0.8     Neutrophils Absolute 12/28/2023 2.47     Immature Granulocytes Ab* 12/28/2023 0.01     Lymphocytes Absolute 12/28/2023 1.97     Monocytes Absolute 12/28/2023 0.38     Eosinophils Absolute 12/28/2023 0.07     Basophils Absolute 12/28/2023 0.04    Office Visit on 12/21/2023   Component Date Value    Vitamin D, 25-Hydroxy, T* 12/21/2023 18 (L)     Glucose 12/21/2023 87     Sodium 12/21/2023 143     Potassium 12/21/2023 4.1     Chloride 12/21/2023 104     Bicarbonate 12/21/2023 29     Anion Gap 12/21/2023 14     Urea Nitrogen 12/21/2023 11     Creatinine 12/21/2023 0.68     eGFR 12/21/2023 >90     Calcium 12/21/2023 10.1     Albumin 12/21/2023 4.5     Alkaline Phosphatase 12/21/2023 58     Total Protein 12/21/2023 6.9     AST 12/21/2023 15     Bilirubin, Total 12/21/2023 0.4     ALT 12/21/2023 11     Vitamin B12 12/21/2023 391     Transferrin 12/21/2023 234     Ferritin 12/21/2023 37     Iron 12/21/2023 106     UIBC 12/21/2023 214     TIBC 12/21/2023 320     % Saturation 12/21/2023 33     Thyroid Stimulating Horm* 12/21/2023 0.93        Assessment/Plan   Safety Assessment: no current (+) past SI, (+) SA, no guns. RF include impulsivity, history of self-harm and SA, , pain, depression, chronic illness PF include children, future focused, hopeful. moderate risk risk    Diagnoses and all orders for this visit: Patient looks to look good and she remains at baseline for mood and anxiety. She considered more TMS treatment during a down time but she recovered and didn't need to have it done. No panic attacks, SI, or H.   Doing well, will continue current medications and follow up  in 3 months.     Greater than 50% of today's appointment consisted of supportive therapy, counseling, psych-education, and care coordination .   Anxiety associated with depression  Recurrent depression (CMS/HCC)  -     buPROPion (Wellbutrin) 75 mg tablet; Take 1 tablet (75 mg) by mouth once daily.  -     ARIPiprazole (Abilify) 1 mg/mL solution; Take 5 mL (5 mg) by mouth once daily.    Follow up in 3 months 9/22/2024 at 11 am     Time     2 minutes chart review  20 minutes direct patient contact  10 minutes charting     Total Time 32 minutes

## 2024-06-17 ENCOUNTER — APPOINTMENT (OUTPATIENT)
Dept: BEHAVIORAL HEALTH | Facility: CLINIC | Age: 34
End: 2024-06-17
Payer: COMMERCIAL

## 2024-09-22 ENCOUNTER — APPOINTMENT (OUTPATIENT)
Dept: BEHAVIORAL HEALTH | Facility: CLINIC | Age: 34
End: 2024-09-22
Payer: COMMERCIAL

## 2024-09-22 DIAGNOSIS — F33.9 RECURRENT DEPRESSION (CMS-HCC): ICD-10-CM

## 2024-09-22 DIAGNOSIS — F41.9 ANXIETY: ICD-10-CM

## 2024-09-22 PROCEDURE — 99214 OFFICE O/P EST MOD 30 MIN: CPT

## 2024-09-22 PROCEDURE — 1036F TOBACCO NON-USER: CPT

## 2024-09-22 RX ORDER — ARIPIPRAZOLE ORAL 1 MG/ML
5 SOLUTION ORAL DAILY
Qty: 450 ML | Refills: 3 | Status: SHIPPED | OUTPATIENT
Start: 2024-09-22 | End: 2025-09-17

## 2024-09-22 NOTE — PROGRESS NOTES
"Patient ID: Reyes Win is a 33 y.o. female with past medical history of food and latex allergies, POTS, Yecenia-Danlos syndrome, and IBS , presenting today for follow up appointment for anxiety, depression, and trauma.      Chief Complain - Follow up      Patient provided 2 personal identifiers today prior to virtual appointment.    Patient reports that she is  stable and is satisfied with her current medications.  Cannabis is working for Pain     Patient reports that her mood is \"good\" and she is doing well. She hasn't had any more TMS treatments     No report of SI or HI.     The patient has ongoing anxiety with no new or worsening symptoms. She hasn't experienced panic attacks.     No report of irritability or anger. No hypomania, carolyn, or psychosis.     Patient denies sleep or appetite problems.     The children keep her busy but Raz and Jose are now in school leaving her alone during the day with Jg.     Patient has no physical complaints or new medical diagnoses.      Review of Systems     Depressive Symptoms: not depressed or irritable, no loss of interest, no change in appetite, no recent lb weight gain, no recent lb weight loss, no insomnia, no fatigue or loss of energy, not feeling worthless or guilty, normal concentration, ability to make decisions, no suicidal ideation, no guns or weapons in household.   Manic Symptoms: mood is not irritable or elevated, self esteem is not grandiose or increased, no changes in need for sleep, not more talkative than usual, does not have flight of ideas or racing thoughts, no distractibility, no psychomotor agitation or increased goal-directed activity, no excessive involvement in pleasurable activities.   Psychotic Symptoms: no hallucinations, no delusions, no disorganized speech, does not have disorganized behavior or catatonia, no negative symptoms.   Anxiety Symptoms: occasional mild anxiety symptoms, no excessive worry, no difficulty controlling " worry, (+) exposure to traumatic event, restlessness / not feeling on edge due to worry,  no panic attacks, no concerns about future panic attacks, no worry about panic attack consequences, no change in behavior due to panic attacks, no increase in arousal, not easily fatigued due to worry, no difficulty concentrating due to worry, no irritability due to worry, no muscle tension due to worry, no sleep disturbances due to worry, no specific phobia, no social phobia, no obsessions, no compulsions, no re-experiencing of traumatic event, no avoidance of stimuli and number of responsiveness.   Delirium/ Altered Mental Status Symptoms: no disturbances of consciousness, no diminished ability to focus, sustain, shift attention, no change in cognition or perceptual disturbances, symptoms do not fluctuate during the course of the day, general medical condition is not present.   Disordered Eating Symptoms: weight is not less than 85% of ideal body weight.   Post-traumatic stress disorder symptoms The patient is currently experiencing symptoms, emotional numbing, feeling detached, relationship problems, irritability, inability to concentrate.   Inattentive Symptoms: often has difficulty paying attention, often avoids/dislikes tasks with sustained mental effort, but does not make careless mistakes often, not often disorganized, does not lose things often, is not easily distracted, is not often forgetful, listens when spoken to directly, is able to follow instructions and finish schoolwork.   Other Symptoms/ Concerns: impulse control symptoms, personality disorder symptoms.   All other systems have been reviewed and are negative for complaint.      Constitutional: no sleep apnea, normal sleeping, no night wakings, no snoring, not a picky eater, normal appetite, no swallowing problems, no night terrors, no nightmares, no restless sleep, no snorts/gasps and no obesity.   Eyes: does not wear glasses/contacts.   ENT: no hearing loss.  "  Cardiovascular: no chest pain, no palpitations and no syncope . POTS Syndrome.   Respiratory: no wheezing and no asthma/reactive airway disease.   Gastrointestinal: no constipation, no abdominal pain, no nausea, no vomiting, no diarrhea, no blood in stools and no reflux.   Genitourinary: no nocturnal enuresis and no incontinence.   Musculoskeletal: myalgias and normal gait, but moving all extremities well and symmetrical, no arthritis or joint problems and no muscle weakness.   Neurological: headache, but no head injury, no seizures, no staring spells and no loss of consciousness.   Endocrine: no temperature intolerance.   Hematologic/Lymphatic: no anemia and no lead poisoning.   All other systems have been reviewed and are negative for complaint.      Mental Status Exam     General: Appropriately groomed and dressed, good eye contact  Appearance: Appears stated age   Attitude: Calm, cooperative   Behavior: Appropriate eye contact, attentive, engaged  Motor Activity: gait not assessed - virtual, no tics or tremors, no EPS/TD, no muscle atrophy.   Speech: Regular rate, rhythm, volume and tone, spontaneous, fluent.   Mood: \"good \".   Affect: Euthymic .   Thought Process: Organized, linear, goal directed. Associations are logical.   Thought Content: Does not endorse suicidal or homicidal ideation, no delusions elicited.   Thought Perception: Does not endorse auditory or visual hallucinations, does not appear to be responding to hallucinatory stimuli.   Cognition: Alert, oriented x3. No deficits noted. Adequate fund of knowledge. No deficit in recent and remote memory. No deficits in attention, concentration or language.    Insight: Good, as patient recognizes symptoms of illness and need for recommended treatments.   Judgment: Can make reasonable decisions about ordinary activities of daily living and necessary medical care recommendations.            Lab Review:         Clinical Support on 12/28/2023   Component " Date Value    WBC 12/28/2023 4.9     nRBC 12/28/2023 0.0     RBC 12/28/2023 4.56     Hemoglobin 12/28/2023 13.9     Hematocrit 12/28/2023 43.3     MCV 12/28/2023 95     MCH 12/28/2023 30.5     MCHC 12/28/2023 32.1     RDW 12/28/2023 12.4     Platelets 12/28/2023 243     Neutrophils % 12/28/2023 50.0     Immature Granulocytes %,* 12/28/2023 0.2     Lymphocytes % 12/28/2023 39.9     Monocytes % 12/28/2023 7.7     Eosinophils % 12/28/2023 1.4     Basophils % 12/28/2023 0.8     Neutrophils Absolute 12/28/2023 2.47     Immature Granulocytes Ab* 12/28/2023 0.01     Lymphocytes Absolute 12/28/2023 1.97     Monocytes Absolute 12/28/2023 0.38     Eosinophils Absolute 12/28/2023 0.07     Basophils Absolute 12/28/2023 0.04    Office Visit on 12/21/2023   Component Date Value    Vitamin D, 25-Hydroxy, T* 12/21/2023 18 (L)     Glucose 12/21/2023 87     Sodium 12/21/2023 143     Potassium 12/21/2023 4.1     Chloride 12/21/2023 104     Bicarbonate 12/21/2023 29     Anion Gap 12/21/2023 14     Urea Nitrogen 12/21/2023 11     Creatinine 12/21/2023 0.68     eGFR 12/21/2023 >90     Calcium 12/21/2023 10.1     Albumin 12/21/2023 4.5     Alkaline Phosphatase 12/21/2023 58     Total Protein 12/21/2023 6.9     AST 12/21/2023 15     Bilirubin, Total 12/21/2023 0.4     ALT 12/21/2023 11     Vitamin B12 12/21/2023 391     Transferrin 12/21/2023 234     Ferritin 12/21/2023 37     Iron 12/21/2023 106     UIBC 12/21/2023 214     TIBC 12/21/2023 320     % Saturation 12/21/2023 33     Thyroid Stimulating Horm* 12/21/2023 0.93             Assessment/Plan  Safety Assessment: no current (+) past SI, (+) SA, no guns. RF include impulsivity, history of self-harm and SA, , pain, depression, chronic illness PF include children, future focused, hopeful. moderate risk risk     Diagnoses and all orders for this visit: The patient is doing well and she is at baseline for mood and anxiety. She denies SI, HI, or panic attacks. She is sleeping well and denies  appetite problems. Will continue current medications and follow up in 3 months to reassess symptoms.     Greater than 50% of today's appointment consisted of supportive therapy, counseling, psych-education, and care coordination .   Anxiety associated with depression  Recurrent depression (CMS/HCC)  -     buPROPion (Wellbutrin) 75 mg tablet; Take 1 tablet (75 mg) by mouth once daily.  -     ARIPiprazole (Abilify) 1 mg/mL solution; Take 5 mL (5 mg) by mouth once daily.     Follow up in 3 months.      Time      2 minutes chart review  20 minutes direct patient contact  10 minutes charting      Total Time 32 minutes

## 2024-10-01 NOTE — PATIENT INSTRUCTIONS
1. Reviewed diagnostic impression including subjective and objective data and provided education about anxiety disorder depression and PTSD , etiology, treatment recommendations including medication, therapy, course of treatment and prognosis. Patient amendable to treatment plan.     2. Continue therapy. Enjoy the fall, continue your current medications, and follow up in 3 months.      Recommendations  CONTINUE : 75 mg Bupropion -= take 1 tablet in the morning daily for depression  CONTINUE 5 ml Abilify - take once daily for mood stabilization (5 ml or 1 mg/ml).     3. Reviewed r/b/a, possible side effects of the medication(s). Client is aware benefit/risks      4. Labs reviewed and discussed     5. Plan for supportive psychotherapy     6. Follow up with physical health providers as scheduled     7. Follow up in 3 months      May follow up sooner if experiences worsening symptoms by calling  Psychiatry at [phone]      Patient verbalized an understanding to call [facility] at [phone] ([facility]), [phone], or 129/go to the nearest emergency room if experiences thoughts of harm to self or others.

## 2024-12-21 ENCOUNTER — APPOINTMENT (OUTPATIENT)
Dept: BEHAVIORAL HEALTH | Facility: CLINIC | Age: 34
End: 2024-12-21
Payer: COMMERCIAL

## 2025-01-04 ENCOUNTER — APPOINTMENT (OUTPATIENT)
Dept: BEHAVIORAL HEALTH | Facility: CLINIC | Age: 35
End: 2025-01-04
Payer: COMMERCIAL

## 2025-01-05 ENCOUNTER — APPOINTMENT (OUTPATIENT)
Dept: BEHAVIORAL HEALTH | Facility: CLINIC | Age: 35
End: 2025-01-05
Payer: COMMERCIAL

## 2025-01-05 DIAGNOSIS — F33.9 RECURRENT DEPRESSION (CMS-HCC): ICD-10-CM

## 2025-01-05 DIAGNOSIS — F41.9 MILD ANXIETY: ICD-10-CM

## 2025-01-05 PROCEDURE — 99214 OFFICE O/P EST MOD 30 MIN: CPT

## 2025-01-05 PROCEDURE — 1036F TOBACCO NON-USER: CPT

## 2025-01-05 RX ORDER — BUPROPION HYDROCHLORIDE 75 MG/1
75 TABLET ORAL DAILY
Qty: 90 TABLET | Refills: 3 | Status: SHIPPED | OUTPATIENT
Start: 2025-01-05 | End: 2026-01-05

## 2025-01-05 NOTE — PATIENT INSTRUCTIONS
1. Reviewed diagnostic impression including subjective and objective data and provided education about anxiety disorder depression and PTSD , etiology, treatment recommendations including medication, therapy, course of treatment and prognosis. Patient amendable to treatment plan.     2. Continue therapy. You are doing well, continue current medications and follow up in April      Recommendations  CONTINUE : 75 mg Bupropion -= take 1 tablet in the morning daily for depression  CONTINUE 5 ml Abilify - take once daily for mood stabilization (5 ml or 1 mg/ml).     3. Reviewed r/b/a, possible side effects of the medication(s). Client is aware benefit/risks      4. Labs reviewed and discussed     5. Plan for supportive psychotherapy     6. Follow up with physical health providers as scheduled     7. Follow up in 3 months 4/13/2025 at 2 pm      May follow up sooner if experiences worsening symptoms by calling  Psychiatry at [phone]      Patient verbalized an understanding to call [facility] at [phone] ([facility]), [phone], or 272/go to the nearest emergency room if experiences thoughts of harm to self or others.

## 2025-01-05 NOTE — PROGRESS NOTES
"Patient ID: Reyes Win is a 34  y.o. female with past medical history of food and latex allergies, POTS, Yecenia-Danlos syndrome, and IBS , presenting today for follow up appointment for anxiety, depression, and trauma.      Chief Complain - Follow up      Patient provided 2 personal identifiers today prior to virtual appointment.    Mental health is going OK. Mood is \"pretty good.\"  No acute depression or anxiety symptoms. No SI or panic attacks.     Patient is sleeping well.     No appetite problems reported.     No new physical complaints or new medical diagnoses.     Daughter has autism. No sure about son.     Patient would like more information about Autism. Will speak with Dr. Gamez.      Review of Systems     Depressive Symptoms: not depressed or irritable, no loss of interest, no change in appetite, no recent lb weight gain, no recent lb weight loss, no insomnia, no fatigue or loss of energy, not feeling worthless or guilty, normal concentration, ability to make decisions, no suicidal ideation, no guns or weapons in household.   Manic Symptoms: mood is not irritable or elevated, self esteem is not grandiose or increased, no changes in need for sleep, not more talkative than usual, does not have flight of ideas or racing thoughts, no distractibility, no psychomotor agitation or increased goal-directed activity, no excessive involvement in pleasurable activities.   Psychotic Symptoms: no hallucinations, no delusions, no disorganized speech, does not have disorganized behavior or catatonia, no negative symptoms.   Anxiety Symptoms: occasional mild anxiety symptoms, no excessive worry, no difficulty controlling worry, (+) exposure to traumatic event, restlessness / not feeling on edge due to worry,  no panic attacks, no concerns about future panic attacks, no worry about panic attack consequences, no change in behavior due to panic attacks, no increase in arousal, not easily fatigued due to worry, no " difficulty concentrating due to worry, no irritability due to worry, no muscle tension due to worry, no sleep disturbances due to worry, no specific phobia, no social phobia, no obsessions, no compulsions, no re-experiencing of traumatic event, no avoidance of stimuli and number of responsiveness.   Delirium/ Altered Mental Status Symptoms: no disturbances of consciousness, no diminished ability to focus, sustain, shift attention, no change in cognition or perceptual disturbances, symptoms do not fluctuate during the course of the day, general medical condition is not present.   Disordered Eating Symptoms: weight is not less than 85% of ideal body weight.   Post-traumatic stress disorder symptoms The patient is currently experiencing symptoms, emotional numbing, feeling detached, relationship problems, irritability, inability to concentrate.   Inattentive Symptoms: often has difficulty paying attention, often avoids/dislikes tasks with sustained mental effort, but does not make careless mistakes often, not often disorganized, does not lose things often, is not easily distracted, is not often forgetful, listens when spoken to directly, is able to follow instructions and finish schoolwork.   Other Symptoms/ Concerns: impulse control symptoms, personality disorder symptoms.   All other systems have been reviewed and are negative for complaint.      Constitutional: no sleep apnea, normal sleeping, no night wakings, no snoring, not a picky eater, normal appetite, no swallowing problems, no night terrors, no nightmares, no restless sleep, no snorts/gasps and no obesity.   Eyes: does not wear glasses/contacts.   ENT: no hearing loss.   Cardiovascular: no chest pain, no palpitations and no syncope . POTS Syndrome.   Respiratory: no wheezing and no asthma/reactive airway disease.   Gastrointestinal: no constipation, no abdominal pain, no nausea, no vomiting, no diarrhea, no blood in stools and no reflux.   Genitourinary: no  "nocturnal enuresis and no incontinence.   Musculoskeletal: myalgias and normal gait, but moving all extremities well and symmetrical, no arthritis or joint problems and no muscle weakness.   Neurological: headache, but no head injury, no seizures, no staring spells and no loss of consciousness.   Endocrine: no temperature intolerance.   Hematologic/Lymphatic: no anemia and no lead poisoning.   All other systems have been reviewed and are negative for complaint.      Mental Status Exam     General: Appropriately groomed and dressed, good eye contact  Appearance: Appears stated age   Attitude: Calm, cooperative   Behavior: Appropriate eye contact, attentive, engaged  Motor Activity: gait not assessed - virtual, no tics or tremors, no EPS/TD, no muscle atrophy.   Speech: Regular rate, rhythm, volume and tone, spontaneous, fluent.   Mood: \"pretty good \".   Affect: appropriate  .   Thought Process: Organized, linear, goal directed. Associations are logical.   Thought Content: Does not endorse suicidal or homicidal ideation, no delusions elicited.   Thought Perception: Does not endorse auditory or visual hallucinations, does not appear to be responding to hallucinatory stimuli.   Cognition: Alert, oriented x3. No deficits noted. Adequate fund of knowledge. No deficit in recent and remote memory. No deficits in attention, concentration or language.    Insight: Good, as patient recognizes symptoms of illness and need for recommended treatments.   Judgment: Can make reasonable decisions about ordinary activities of daily living and necessary medical care recommendations.            Lab Review:         Clinical Support on 12/28/2023   Component Date Value    WBC 12/28/2023 4.9     nRBC 12/28/2023 0.0     RBC 12/28/2023 4.56     Hemoglobin 12/28/2023 13.9     Hematocrit 12/28/2023 43.3     MCV 12/28/2023 95     MCH 12/28/2023 30.5     MCHC 12/28/2023 32.1     RDW 12/28/2023 12.4     Platelets 12/28/2023 243     Neutrophils % " 12/28/2023 50.0     Immature Granulocytes %,* 12/28/2023 0.2     Lymphocytes % 12/28/2023 39.9     Monocytes % 12/28/2023 7.7     Eosinophils % 12/28/2023 1.4     Basophils % 12/28/2023 0.8     Neutrophils Absolute 12/28/2023 2.47     Immature Granulocytes Ab* 12/28/2023 0.01     Lymphocytes Absolute 12/28/2023 1.97     Monocytes Absolute 12/28/2023 0.38     Eosinophils Absolute 12/28/2023 0.07     Basophils Absolute 12/28/2023 0.04    Office Visit on 12/21/2023   Component Date Value    Vitamin D, 25-Hydroxy, T* 12/21/2023 18 (L)     Glucose 12/21/2023 87     Sodium 12/21/2023 143     Potassium 12/21/2023 4.1     Chloride 12/21/2023 104     Bicarbonate 12/21/2023 29     Anion Gap 12/21/2023 14     Urea Nitrogen 12/21/2023 11     Creatinine 12/21/2023 0.68     eGFR 12/21/2023 >90     Calcium 12/21/2023 10.1     Albumin 12/21/2023 4.5     Alkaline Phosphatase 12/21/2023 58     Total Protein 12/21/2023 6.9     AST 12/21/2023 15     Bilirubin, Total 12/21/2023 0.4     ALT 12/21/2023 11     Vitamin B12 12/21/2023 391     Transferrin 12/21/2023 234     Ferritin 12/21/2023 37     Iron 12/21/2023 106     UIBC 12/21/2023 214     TIBC 12/21/2023 320     % Saturation 12/21/2023 33     Thyroid Stimulating Horm* 12/21/2023 0.93             Assessment/Plan  Safety Assessment: no current (+) past SI, (+) SA, no guns. RF include impulsivity, history of self-harm and SA, , pain, depression, chronic illness PF include children, future focused, hopeful. moderate risk risk     Diagnoses and all orders for this visit: Patient with no signs of clinical depression or anxiety. Will continue current medications and follow up in 3 months   Greater than 50% of today's appointment consisted of supportive therapy, counseling, psych-education, and care coordination .   Anxiety associated with depression  Recurrent depression (CMS/HCC)  -     buPROPion (Wellbutrin) 75 mg tablet; Take 1 tablet (75 mg) by mouth once daily.  -     ARIPiprazole  (Abilify) 1 mg/mL solution; Take 5 mL (5 mg) by mouth once daily.     Follow up in 3 months.      Time      2 minutes chart review  20 minutes direct patient contact  10 minutes charting      Total Time 32 minutes

## 2025-01-13 ENCOUNTER — LAB (OUTPATIENT)
Dept: LAB | Facility: LAB | Age: 35
End: 2025-01-13
Payer: COMMERCIAL

## 2025-01-13 ENCOUNTER — APPOINTMENT (OUTPATIENT)
Dept: PRIMARY CARE | Facility: CLINIC | Age: 35
End: 2025-01-13
Payer: COMMERCIAL

## 2025-01-13 VITALS
HEART RATE: 81 BPM | SYSTOLIC BLOOD PRESSURE: 116 MMHG | HEIGHT: 69 IN | OXYGEN SATURATION: 98 % | BODY MASS INDEX: 20.59 KG/M2 | RESPIRATION RATE: 18 BRPM | DIASTOLIC BLOOD PRESSURE: 79 MMHG | WEIGHT: 139 LBS

## 2025-01-13 DIAGNOSIS — E55.9 VITAMIN D DEFICIENCY: ICD-10-CM

## 2025-01-13 DIAGNOSIS — Z13.6 SCREENING FOR CARDIOVASCULAR CONDITION: ICD-10-CM

## 2025-01-13 DIAGNOSIS — Z00.00 ANNUAL PHYSICAL EXAM: Primary | ICD-10-CM

## 2025-01-13 PROBLEM — F33.1 MODERATE EPISODE OF RECURRENT MAJOR DEPRESSIVE DISORDER: Status: ACTIVE | Noted: 2018-10-18

## 2025-01-13 PROBLEM — G47.00 INSOMNIA: Status: ACTIVE | Noted: 2025-01-13

## 2025-01-13 PROBLEM — Z97.5 PRESENCE OF INTRAUTERINE CONTRACEPTIVE DEVICE: Status: ACTIVE | Noted: 2025-01-13

## 2025-01-13 PROBLEM — B00.9 HERPESVIRAL INFECTION, UNSPECIFIED: Status: RESOLVED | Noted: 2023-03-24 | Resolved: 2025-01-13

## 2025-01-13 PROBLEM — F12.10 CANNABIS USE DISORDER, MILD, ABUSE: Status: ACTIVE | Noted: 2019-07-19

## 2025-01-13 PROBLEM — F12.90 USE OF CANNABIS: Status: RESOLVED | Noted: 2023-12-09 | Resolved: 2025-01-13

## 2025-01-13 PROBLEM — F33.2 SEVERE EPISODE OF RECURRENT MAJOR DEPRESSIVE DISORDER, WITHOUT PSYCHOTIC FEATURES (MULTI): Status: ACTIVE | Noted: 2022-12-24

## 2025-01-13 PROBLEM — N91.2 AMENORRHEA: Status: RESOLVED | Noted: 2023-12-09 | Resolved: 2025-01-13

## 2025-01-13 PROBLEM — R14.0 ABDOMINAL BLOATING: Status: ACTIVE | Noted: 2025-01-13

## 2025-01-13 LAB
25(OH)D3 SERPL-MCNC: 71 NG/ML (ref 30–100)
ALBUMIN SERPL BCP-MCNC: 4.1 G/DL (ref 3.4–5)
ALP SERPL-CCNC: 50 U/L (ref 33–110)
ALT SERPL W P-5'-P-CCNC: 10 U/L (ref 7–45)
ANION GAP SERPL CALC-SCNC: 12 MMOL/L (ref 10–20)
AST SERPL W P-5'-P-CCNC: 15 U/L (ref 9–39)
BASOPHILS # BLD AUTO: 0.05 X10*3/UL (ref 0–0.1)
BASOPHILS NFR BLD AUTO: 0.9 %
BILIRUB SERPL-MCNC: 0.4 MG/DL (ref 0–1.2)
BUN SERPL-MCNC: 7 MG/DL (ref 6–23)
CALCIUM SERPL-MCNC: 9.4 MG/DL (ref 8.6–10.6)
CHLORIDE SERPL-SCNC: 104 MMOL/L (ref 98–107)
CHOLEST SERPL-MCNC: 183 MG/DL (ref 0–199)
CHOLESTEROL/HDL RATIO: 2.8
CO2 SERPL-SCNC: 28 MMOL/L (ref 21–32)
CREAT SERPL-MCNC: 0.62 MG/DL (ref 0.5–1.05)
EGFRCR SERPLBLD CKD-EPI 2021: >90 ML/MIN/1.73M*2
EOSINOPHIL # BLD AUTO: 0.02 X10*3/UL (ref 0–0.7)
EOSINOPHIL NFR BLD AUTO: 0.4 %
ERYTHROCYTE [DISTWIDTH] IN BLOOD BY AUTOMATED COUNT: 12.9 % (ref 11.5–14.5)
GLUCOSE SERPL-MCNC: 84 MG/DL (ref 74–99)
HCT VFR BLD AUTO: 40.3 % (ref 36–46)
HDLC SERPL-MCNC: 65.9 MG/DL
HGB BLD-MCNC: 12.7 G/DL (ref 12–16)
IMM GRANULOCYTES # BLD AUTO: 0.02 X10*3/UL (ref 0–0.7)
IMM GRANULOCYTES NFR BLD AUTO: 0.4 % (ref 0–0.9)
LDLC SERPL CALC-MCNC: 106 MG/DL
LYMPHOCYTES # BLD AUTO: 1.63 X10*3/UL (ref 1.2–4.8)
LYMPHOCYTES NFR BLD AUTO: 28.5 %
MCH RBC QN AUTO: 28.9 PG (ref 26–34)
MCHC RBC AUTO-ENTMCNC: 31.5 G/DL (ref 32–36)
MCV RBC AUTO: 92 FL (ref 80–100)
MONOCYTES # BLD AUTO: 0.42 X10*3/UL (ref 0.1–1)
MONOCYTES NFR BLD AUTO: 7.4 %
NEUTROPHILS # BLD AUTO: 3.57 X10*3/UL (ref 1.2–7.7)
NEUTROPHILS NFR BLD AUTO: 62.4 %
NON HDL CHOLESTEROL: 117 MG/DL (ref 0–149)
NRBC BLD-RTO: 0 /100 WBCS (ref 0–0)
PLATELET # BLD AUTO: 327 X10*3/UL (ref 150–450)
POTASSIUM SERPL-SCNC: 4.2 MMOL/L (ref 3.5–5.3)
PROT SERPL-MCNC: 6.5 G/DL (ref 6.4–8.2)
RBC # BLD AUTO: 4.39 X10*6/UL (ref 4–5.2)
SODIUM SERPL-SCNC: 140 MMOL/L (ref 136–145)
TRIGL SERPL-MCNC: 57 MG/DL (ref 0–149)
TSH SERPL-ACNC: 1.21 MIU/L (ref 0.44–3.98)
VLDL: 11 MG/DL (ref 0–40)
WBC # BLD AUTO: 5.7 X10*3/UL (ref 4.4–11.3)

## 2025-01-13 PROCEDURE — 84443 ASSAY THYROID STIM HORMONE: CPT

## 2025-01-13 PROCEDURE — 1036F TOBACCO NON-USER: CPT | Performed by: NURSE PRACTITIONER

## 2025-01-13 PROCEDURE — 85025 COMPLETE CBC W/AUTO DIFF WBC: CPT

## 2025-01-13 PROCEDURE — 82306 VITAMIN D 25 HYDROXY: CPT

## 2025-01-13 PROCEDURE — 80061 LIPID PANEL: CPT

## 2025-01-13 PROCEDURE — 80053 COMPREHEN METABOLIC PANEL: CPT

## 2025-01-13 PROCEDURE — 99395 PREV VISIT EST AGE 18-39: CPT | Performed by: NURSE PRACTITIONER

## 2025-01-13 PROCEDURE — 3008F BODY MASS INDEX DOCD: CPT | Performed by: NURSE PRACTITIONER

## 2025-01-13 ASSESSMENT — PATIENT HEALTH QUESTIONNAIRE - PHQ9
1. LITTLE INTEREST OR PLEASURE IN DOING THINGS: NOT AT ALL
2. FEELING DOWN, DEPRESSED OR HOPELESS: NOT AT ALL
SUM OF ALL RESPONSES TO PHQ9 QUESTIONS 1 AND 2: 0

## 2025-01-13 NOTE — PROGRESS NOTES
Reyes Dawson is a 34 y.o. female who is presenting for annual physical exam.     Last  Visit: 2023  Reported Health: Fair    Dental, Vision, Hearing:  Regular dental visits: yes  - Brushes teeth 2 times per day  - Flosses? yes  Vision problems: yes  - Wears glasses or contacts? no but supposed to wear glasses  - Last eye exam:  a few years ago  Hearing loss: no    Immunization status:  Up to date: no    Lifestyle:  Healthy diet: yes  Regular exercise: yes  - Exercise frequency: 3 times per week  - Type of exercise: cardio and weight lifting   Alcohol: no  Tobacco: no  Drugs: no  Marijuana: occasionally    Reproductive Health:  Menstrual problems: no  - LMP:  about 2 weeks ago  Sexually active: yes, 1 male partner  Contraception: tubal ligation    Pregnancy History:   : 4  Parity: 3  - Full term: 3  - Premature:  - (s):  - Living:  - AB Induced:  - AB Spont: 1  - Ectopic:   - Multiple births:    Cervical Cancer Screening:  Last pap:    History of abnormal pap smear? no  Breast Cancer Screening:  Last mammogram:  n/a  Colorectal Cancer Screening:  Last colonoscopy or Cologuard:  n/a  Metabolic Screening:  Lipid profile:   Glucose screen:     Review of Systems  Gen: denies fever, chills, weight loss, fatigue  HEENT: denies sinus pressure, sinus congestion, runny nose, red eyes, itchy eyes, vision loss, ear pain, hearing loss, throat pain, trouble swallowing  Neck: denies neck pain, neck swelling or masses  Chest/breast: denies breast pain, breast lumps, nipple discharge  CV: denies chest pain, palpitations, fast heart rate, syncope  Resp: denies shortness of breath, cough, wheezing  GI: hemorrhoids. denies abdominal pain, nausea, diarrhea, constipation, hematochezia, melena  : denies dysuria, hematuria, vaginal discharge, frequency  Endo: denies polydipsia, polyuria, heat/cold intolerance, weight change, hair thinning  Heme: denies easy bruising, easy bleeding  Neuro: denies  headache, numbness, tingling, memory loss, changes in vision  MSK: denies joint pain, joint swelling, weakness  Psych: denies suicidal ideation, homicidal ideation, depression, anxiety, mood swings  Skin: denies rashes, abnormal lesions, itching, changes in moles     Previous History  Past Medical History:   Diagnosis Date    29 weeks gestation of pregnancy ([facility]) 02/04/2019    29 weeks gestation of pregnancy    36 weeks gestation of pregnancy ([facility]) 03/25/2019    36 weeks gestation of pregnancy    Diseases of the circulatory system complicating pregnancy, unspecified trimester (CODE) 02/04/2019    Pregnancy complicated by cardiac condition, antepartum    Encounter for insertion of intrauterine contraceptive device 05/16/2019    Encounter for insertion of intrauterine contraceptive device (IUD)    Encounter for pregnancy test, result positive ([facility]) 09/27/2018    Positive pregnancy test    Encounter for prophylactic Rho(d) immune globulin 02/04/2019    Need for rhogam due to Rh negative mother    Encounter for screening for infections with a predominantly sexual mode of transmission 01/26/2017    Screen for STD (sexually transmitted disease)    Encounter for screening for infections with a predominantly sexual mode of transmission 09/22/2016    Screen for STD (sexually transmitted disease)    Encounter for supervision of normal pregnancy, unspecified, second trimester 01/10/2019    Second trimester pregnancy    Encounter for supervision of normal pregnancy, unspecified, third trimester 12/17/2020    Third trimester pregnancy    Encounter for supervision of normal pregnancy, unspecified, unspecified trimester 09/20/2018    Pregnancy with uncertain dates    Maternal care for (suspected) hereditary disease in fetus, not applicable or unspecified ([facility]) 03/25/2019    Hereditary disease in family possibly affecting pregnancy, antepartum    Other mental disorders complicating pregnancy, unspecified trimester  ([facility]) 03/25/2019    Depression affecting pregnancy, antepartum    Other specified health status 04/16/2015    Known health problems: none    Personal history of other diseases of the female genital tract     Personal history of amenorrhea    Personal history of other mental and behavioral disorders 11/08/2022    History of suicidal ideation     Past Surgical History:   Procedure Laterality Date    OTHER SURGICAL HISTORY  02/25/2014    [facility]nec[facility] Services Intrauterine Device (IUD) Insertion    OTHER SURGICAL HISTORY  02/25/2014    Dental Surgery    OTHER SURGICAL HISTORY  06/18/2021    Tubal ligation     Social History     Tobacco Use    Smoking status: Never    Smokeless tobacco: Never   Substance Use Topics    Alcohol use: Not Currently    Drug use: Never     Family History   Problem Relation Name Age of Onset    No Known Problems Mother      Diabetes Maternal Grandmother      Fibromyalgia Maternal Grandmother      Hyperlipidemia Maternal Grandmother      Other (tachycardia) Maternal Grandmother      Diabetes Maternal Grandfather      Hyperlipidemia Maternal Grandfather      Kidney disease Paternal Grandfather       Allergies   Allergen Reactions    Shellfish Containing Products Anaphylaxis    Codeine Unknown    Corn Other     Headache Evaluate products for starch, lactose, or riboflavin. IV solutions should be compounded in NS (avoid D5W)    Iodine Unknown    Latex Hives    Milk Containing Products (Dairy) Unknown    Peanut Unknown    Soy Other     nausea    Wheat Other     Gi issues     Current Outpatient Medications   Medication Instructions    ARIPiprazole (ABILIFY) 5 mg, oral, Daily    B.animalis,bifid,infantis,long (PROBIOTIC 4X ORAL) Take by mouth.    buPROPion (WELLBUTRIN) 75 mg, oral, Daily    ketoconazole (NIZOral) 2 % shampoo Lather for 3-5 minutes every other day then rinse    mupirocin (Bactroban) 2 % ointment Apply to the face 2x daily as needed for flaring    vitamin B complex (B Complex-Vitamin  B12) tablet 1 tablet, Daily       Physical Exam  General: Alert and oriented, in no acute distress. Appears stated age, well-nourished, and well hydrated  HEENT:  - Head: Normocephalic and atraumatic   - Eyes: EOMI, PERRLA  - ENT: Hearing grossly intact. Mucus membranes pink and moist without lesions. Tonsils present without swelling or exudates. Good dentition. TMs gray  Neck: Supple. No stiffness. No thyromegaly or thyroid nodules  Heart: RRR. No murmurs, clicks, or rubs  Lungs: Unlabored breathing. CTAB with no crackles, wheezes, or rhonchi  Abdomen: Normal BS in all 4 quadrants. Soft, non-tender, non-distended, with no masses  Extremities: Warm and well perfused. No edema. Normal peripheral pulses  Musculoskeletal: ROM intact. Strength 5/5 in BUE and BLE. No joint swelling. Normal gait and station  Neurological: Alert and oriented. No gross neurological deficits. Normal sensation. No weakness. DTRs +2/4   Psychological: Appropriate mood and affect  Skin: No rash, abnormal lesions, cyanosis, or erythema      Assessment and Plan     Annual Physical  - Declined flu and Hep B vaccines  - Pap due 2026  - Labs: lipid panel, CMP, TSH, CBCd, vit D  - Recommend following up with GI re: hemorrhoids  - Maintain healthy lifestyle    RTC in 1 year for a physical or sooner BRIDGETTE Solorio-CNP  [facility]

## 2025-03-07 ASSESSMENT — DERMATOLOGY QUALITY OF LIFE (QOL) ASSESSMENT
RATE HOW BOTHERED YOU ARE BY SYMPTOMS OF YOUR SKIN PROBLEM (EG, ITCHING, STINGING BURNING, HURTING OR SKIN IRRITATION): 4
RATE HOW BOTHERED YOU ARE BY SYMPTOMS OF YOUR SKIN PROBLEM (EG, ITCHING, STINGING BURNING, HURTING OR SKIN IRRITATION): 4
RATE HOW EMOTIONALLY BOTHERED YOU ARE BY YOUR SKIN PROBLEM (FOR EXAMPLE, WORRY, EMBARRASSMENT, FRUSTRATION): 1
RATE HOW BOTHERED YOU ARE BY EFFECTS OF YOUR SKIN PROBLEMS ON YOUR ACTIVITIES (EG, GOING OUT, ACCOMPLISHING WHAT YOU WANT, WORK ACTIVITIES OR YOUR RELATIONSHIPS WITH OTHERS): 1
RATE HOW EMOTIONALLY BOTHERED YOU ARE BY YOUR SKIN PROBLEM (FOR EXAMPLE, WORRY, EMBARRASSMENT, FRUSTRATION): 1
RATE HOW BOTHERED YOU ARE BY EFFECTS OF YOUR SKIN PROBLEMS ON YOUR ACTIVITIES (EG, GOING OUT, ACCOMPLISHING WHAT YOU WANT, WORK ACTIVITIES OR YOUR RELATIONSHIPS WITH OTHERS): 1

## 2025-03-07 ASSESSMENT — PATIENT GLOBAL ASSESSMENT (PGA): WHAT IS THE PGA: PATIENT GLOBAL ASSESSMENT:  2 - MILD

## 2025-03-13 ENCOUNTER — APPOINTMENT (OUTPATIENT)
Dept: DERMATOLOGY | Facility: CLINIC | Age: 35
End: 2025-03-13
Payer: COMMERCIAL

## 2025-03-13 DIAGNOSIS — D22.5 MELANOCYTIC NEVI OF TRUNK: ICD-10-CM

## 2025-03-13 DIAGNOSIS — D22.71 MELANOCYTIC NEVI OF RIGHT LOWER LIMB, INCLUDING HIP: ICD-10-CM

## 2025-03-13 DIAGNOSIS — Z12.83 ENCOUNTER FOR SCREENING FOR MALIGNANT NEOPLASM OF SKIN: ICD-10-CM

## 2025-03-13 DIAGNOSIS — Z80.8 FAMILY HISTORY OF SKIN CANCER: ICD-10-CM

## 2025-03-13 DIAGNOSIS — L57.8 PHOTOAGING OF SKIN: ICD-10-CM

## 2025-03-13 DIAGNOSIS — D22.72 MELANOCYTIC NEVI OF LEFT LOWER EXTREMITY OR HIP: ICD-10-CM

## 2025-03-13 DIAGNOSIS — L71.0 PERIORAL DERMATITIS: ICD-10-CM

## 2025-03-13 DIAGNOSIS — L71.9 ROSACEA: Primary | ICD-10-CM

## 2025-03-13 DIAGNOSIS — L21.9 SEBORRHEIC DERMATITIS: ICD-10-CM

## 2025-03-13 DIAGNOSIS — D18.01 HEMANGIOMA OF SKIN: ICD-10-CM

## 2025-03-13 DIAGNOSIS — D22.60 MELANOCYTIC NEVI OF UNSPECIFIED UPPER LIMB, INCLUDING SHOULDER: ICD-10-CM

## 2025-03-13 PROCEDURE — 99214 OFFICE O/P EST MOD 30 MIN: CPT | Performed by: DERMATOLOGY

## 2025-03-13 PROCEDURE — 1036F TOBACCO NON-USER: CPT | Performed by: DERMATOLOGY

## 2025-03-13 RX ORDER — MUPIROCIN 20 MG/G
OINTMENT TOPICAL
Qty: 22 G | Refills: 3 | Status: SHIPPED | OUTPATIENT
Start: 2025-03-13

## 2025-03-13 RX ORDER — KETOCONAZOLE 20 MG/ML
SHAMPOO, SUSPENSION TOPICAL
Qty: 120 ML | Refills: 11 | Status: SHIPPED | OUTPATIENT
Start: 2025-03-13

## 2025-03-13 RX ORDER — METRONIDAZOLE 7.5 MG/G
CREAM TOPICAL
Qty: 45 G | Refills: 11 | Status: SHIPPED | OUTPATIENT
Start: 2025-03-13

## 2025-03-13 NOTE — PROGRESS NOTES
Subjective     Reyes Dawson is a 34 y.o. female who presents for the following: Skin Check (Pt here for yearly FBSE. No personal hx, fhx of NMSC/MM -Mother. ) and Rash (Pt has rash/ breakout on cheeks. No treatment. Pt has hx of perioral dermatitis. ). Mother recently diagnosed with melanoma recently.     Review of Systems:  No other skin or systemic complaints other than what is documented elsewhere in the note.    The following portions of the chart were reviewed this encounter and updated as appropriate:         Skin Cancer History  No skin cancer on file.      Specialty Problems          Dermatology Problems    Hemangioma of skin and subcutaneous tissue    Melanocytic nevi of other parts of face    Melanocytic nevi of trunk    Neoplasm of uncertain behavior of skin    Other seborrheic dermatitis    Other skin changes due to chronic exposure to nonionizing radiation    Perioral dermatitis        Objective   Well appearing patient in no apparent distress; mood and affect are within normal limits.    A full examination was performed including scalp, head, eyes, ears, nose, lips, neck, chest, axillae, abdomen, back, buttocks, bilateral upper extremities, bilateral lower extremities, hands, feet, fingers, toes, fingernails, and toenails. Declined examination of genitals, does see provider for routine pelvic examinations.  All findings within normal limits unless otherwise noted below.    Assessment/Plan   1. Rosacea  Head - Anterior (Face)  Mid face erythema with telangiectasias and scattered inflammatory papules.    The chronic and intermittently flaring nature of the skin condition was discussed with the patient today. Discussed common triggers associated with rosacea including sun exposure, spicy foods, alcohol, and stress. The various treatment options and risks and benefits reviewed. Patient to begin metronidazole 0.75% cream - apply to the face 2x daily     metroNIDAZOLE (Metrocream) 0.75 % cream - Head -  Anterior (Face)  Apply a pea sized amount to face 2x daily    2. Photoaging of skin  Mottled pigmentation with telangiectasias and brown reticular macules in sun exposed areas of the body.    The risk of chronic, cumulative sun damage and risk of development of skin cancer was reviewed today.   The importance of sun protection was reviewed: including the use of a broad spectrum sunscreen that protects against both UVA/UVB rays, with ingredients such as Zinc oxide or titanium dioxide, wearing sun protective clothing and sun avoidance. We reviewed the warning signs of non-melanoma skin cancer and ABCDEs of melanoma  Please follow up should you notice any new or changing pre-existing skin lesion.    Related Procedures  Follow Up In Dermatology - Established Patient  Follow Up In Dermatology - Established Patient    3. Melanocytic nevi of trunk (2)  Abdomen (Lower Torso, Anterior), Torso - Posterior (Back)  Tan-brown symmetric macules and papules    Clinically benign appearing nevi, no treatment is necessary.  The importance of sun protection was reviewed: including the use of a broad spectrum sunscreen that protects against both UVA/UVB rays, with ingredients such as Zinc oxide or titanium dioxide, wearing sun protective clothing and sun avoidance.   ABCDEs of melanoma reviewed.  Please follow up should you notice any new or changing pre-existing skin lesion.    4. Melanocytic nevi of unspecified upper limb, including shoulder (2)  Left Arm, Right Arm  Scattered, uniform and benign-appearing, regular brown melanocytic papules and macules.    Clinically benign appearing nevi, no treatment is necessary.  The importance of sun protection was reviewed: including the use of a broad spectrum sunscreen that protects against both UVA/UVB rays, with ingredients such as Zinc oxide or titanium dioxide, wearing sun protective clothing and sun avoidance.   ABCDEs of melanoma reviewed.  Please follow up should you notice any new or  changing pre-existing skin lesion.    5. Melanocytic nevi of left lower extremity or hip  Left Leg  Scattered, uniform and benign-appearing, regular brown melanocytic papules and macules.    Clinically benign appearing nevi, no treatment is necessary.  The importance of sun protection was reviewed: including the use of a broad spectrum sunscreen that protects against both UVA/UVB rays, with ingredients such as Zinc oxide or titanium dioxide, wearing sun protective clothing and sun avoidance.   ABCDEs of melanoma reviewed.  Please follow up should you notice any new or changing pre-existing skin lesion.    6. Melanocytic nevi of right lower limb, including hip  Right Leg  Scattered, uniform and benign-appearing, regular brown melanocytic papules and macules.    Clinically benign appearing nevi, no treatment is necessary.  The importance of sun protection was reviewed: including the use of a broad spectrum sunscreen that protects against both UVA/UVB rays, with ingredients such as Zinc oxide or titanium dioxide, wearing sun protective clothing and sun avoidance.   ABCDEs of melanoma reviewed.  Please follow up should you notice any new or changing pre-existing skin lesion.    7. Hemangioma of skin  Cherry red papules    The benign nature of these skin lesions were reviewed, no treatment is necessary.   Please follow up for any new or pre-existing lesion that is changing in size, shape, color, becomes painful, tender, itches or bleed.    8. Seborrheic dermatitis  Scalp  Clear today    The chronic and intermittently flaring nature of this skin condition was discussed with patient today.   This is due to a yeast that everyone has on their skin that results in redness, dryness and in some patients itching.    Various treatment options were reviewed including topical antifungals and topical steroids depending upon severity and symptoms. Patient advised we cannot cure this condition, but it can be controlled.     Continue  ketoconazole 2% shampoo, lather for 3-5 minutes every other day then rinse.    Related Medications  ketoconazole (NIZOral) 2 % shampoo  Lather for 3-5 minutes every other day then rinse    9. Perioral dermatitis  Left Upper Cutaneous Lip, Right Upper Cutaneous Lip  Slight erythema today    The chronic and intermittently flaring nature of the skin condition was discussed with the patient today. Discussed common triggers associated with perioral dermatitis including fluoride treatment, cinnamon gum and/or mints, inhaled or oral corticosteroids. Various treatment options discussed and risks and benefits of each.   Patient does well with mupirocin ointment and requesting refill today.    If not responding or worsening she was previously on oral doxycycline which was helpful and resulted in clearance of rash advised she can call for refills.      Related Medications  mupirocin (Bactroban) 2 % ointment  Apply to the face 2x daily as needed for flaring    10. Family history of skin cancer  No suspicious lesions noted on examination today    The risk of chronic, cumulative sun damage and risk of development of skin cancer was reviewed today.   The importance of sun protection was reviewed: including the use of a broad spectrum sunscreen of at least SPF 30 that protects against both UVA/UVB rays, with ingredients such as Zinc oxide or titanium dioxide, wearing sun protective clothing and sun avoidance. We reviewed the warning signs of non-melanoma skin cancer and ABCDEs of melanoma  Please follow up should you notice any new or changing pre-existing skin lesion.    11. Encounter for screening for malignant neoplasm of skin  No suspicious lesions noted on examination today    The risk of chronic, cumulative sun damage and risk of development of skin cancer was reviewed today.   The importance of sun protection was reviewed: including the use of a broad spectrum sunscreen of at least SPF 30 that protects against both UVA/UVB  rays, with ingredients such as Zinc oxide or titanium dioxide, wearing sun protective clothing and sun avoidance. We reviewed the warning signs of non-melanoma skin cancer and ABCDEs of melanoma  Please follow up should you notice any new or changing pre-existing skin lesion.    Related Procedures  Follow Up In Dermatology - Established Patient    Follow up in 1 year for FBSE

## 2025-04-13 ENCOUNTER — APPOINTMENT (OUTPATIENT)
Dept: BEHAVIORAL HEALTH | Facility: CLINIC | Age: 35
End: 2025-04-13
Payer: COMMERCIAL

## 2025-04-27 ENCOUNTER — APPOINTMENT (OUTPATIENT)
Dept: BEHAVIORAL HEALTH | Facility: CLINIC | Age: 35
End: 2025-04-27
Payer: COMMERCIAL

## 2025-04-27 DIAGNOSIS — F41.9 ANXIETY: ICD-10-CM

## 2025-04-27 DIAGNOSIS — F33.9 RECURRENT DEPRESSION (CMS-HCC): ICD-10-CM

## 2025-04-27 PROBLEM — F12.10 CANNABIS USE DISORDER, MILD, ABUSE: Status: RESOLVED | Noted: 2019-07-19 | Resolved: 2025-04-27

## 2025-04-27 PROCEDURE — 99214 OFFICE O/P EST MOD 30 MIN: CPT

## 2025-04-27 PROCEDURE — 1036F TOBACCO NON-USER: CPT

## 2025-04-27 NOTE — PROGRESS NOTES
"Patient ID: Reyes Win is a 34  y.o. female with past medical history of food and latex allergies, POTS, Yecenia-Danlos syndrome, and IBS , presenting today for follow up appointment for anxiety, depression, and trauma.      Chief Complain - Follow up      Patient provided 2 personal identifiers today prior to virtual appointment.    Patient reports that she is doing well and her mood is \"good.\" Patient denies experiencing symptoms of irritability, depression, anxiety , or psychosis.     The patient reports that she has hand tremors and questions if this can be coming from her medications. Provider provided education that Abilify can cause tremors but patient isn't interested in changing the medication since she feels that it has been effective for treating irritability.     Patient is sleeping well and denies appetite problems.     No reported SI, HI, or panic attacks.     Patient is keeping busy, lifting weights 3 days per week and going on hikes. She will be hiking following today's appointment.     Patient has 3 children who are doing well at home and at school.      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     No other physical complaints or new medical diagnoses.     Mood   Review of Systems     Depressive Symptoms: not depressed or irritable, no loss of interest, no change in appetite, no recent lb weight gain, no recent lb weight loss, no insomnia, no fatigue or loss of energy, not feeling worthless or guilty, normal concentration, ability to make decisions, no suicidal ideation, no guns or weapons in household.   Manic Symptoms: mood is not irritable or elevated, self esteem is not grandiose or increased, no changes in need for sleep, not more talkative than usual, does not have flight of ideas or racing thoughts, no distractibility, no psychomotor agitation or increased goal-directed activity, no excessive involvement in pleasurable activities.   Psychotic Symptoms: no hallucinations, no " delusions, no disorganized speech, does not have disorganized behavior or catatonia, no negative symptoms.   Anxiety Symptoms: occasional mild anxiety symptoms, no excessive worry, no difficulty controlling worry, (+) exposure to traumatic event, restlessness / not feeling on edge due to worry,  no panic attacks, no concerns about future panic attacks, no worry about panic attack consequences, no change in behavior due to panic attacks, no increase in arousal, not easily fatigued due to worry, no difficulty concentrating due to worry, no irritability due to worry, no muscle tension due to worry, no sleep disturbances due to worry, no specific phobia, no social phobia, no obsessions, no compulsions, no re-experiencing of traumatic event, no avoidance of stimuli and number of responsiveness.   Delirium/ Altered Mental Status Symptoms: no disturbances of consciousness, no diminished ability to focus, sustain, shift attention, no change in cognition or perceptual disturbances, symptoms do not fluctuate during the course of the day, general medical condition is not present.   Disordered Eating Symptoms: weight is not less than 85% of ideal body weight.   Post-traumatic stress disorder symptoms The patient is currently experiencing symptoms, emotional numbing, feeling detached, relationship problems, irritability, inability to concentrate.   Inattentive Symptoms: often has difficulty paying attention, often avoids/dislikes tasks with sustained mental effort, but does not make careless mistakes often, not often disorganized, does not lose things often, is not easily distracted, is not often forgetful, listens when spoken to directly, is able to follow instructions and finish schoolwork.   Other Symptoms/ Concerns: impulse control symptoms, personality disorder symptoms.   All other systems have been reviewed and are negative for complaint.      Constitutional: no sleep apnea, normal sleeping, no night wakings, no snoring,  "not a picky eater, normal appetite, no swallowing problems, no night terrors, no nightmares, no restless sleep, no snorts/gasps and no obesity.   Eyes: does not wear glasses/contacts.   ENT: no hearing loss.   Cardiovascular: no chest pain, no palpitations and no syncope . POTS Syndrome.   Respiratory: no wheezing and no asthma/reactive airway disease.   Gastrointestinal: no constipation, no abdominal pain, no nausea, no vomiting, no diarrhea, no blood in stools and no reflux.   Genitourinary: no nocturnal enuresis and no incontinence.   Musculoskeletal: myalgias and normal gait, but moving all extremities well and symmetrical, no arthritis or joint problems and no muscle weakness.   Neurological: headache, but no head injury, no seizures, no staring spells and no loss of consciousness.   Endocrine: no temperature intolerance.   Hematologic/Lymphatic: no anemia and no lead poisoning.   All other systems have been reviewed and are negative for complaint.      Mental Status Exam     General: Appropriately groomed and dressed, good eye contact  Appearance: Appears stated age   Attitude: Calm, cooperative   Behavior: Appropriate eye contact, attentive, engaged  Motor Activity: gait not assessed - virtual, no tics or tremors, no EPS/TD, no muscle atrophy.   Speech: Regular rate, rhythm, volume and tone, spontaneous, fluent.   Mood: \"good \".   Affect: euthymic   .   Thought Process: Organized, linear, goal directed. Associations are logical.   Thought Content: Does not endorse suicidal or homicidal ideation, no delusions elicited.   Thought Perception: Does not endorse auditory or visual hallucinations, does not appear to be responding to hallucinatory stimuli.   Cognition: Alert, oriented x3. No deficits noted. Adequate fund of knowledge. No deficit in recent and remote memory. No deficits in attention, concentration or language.    Insight: Good, as patient recognizes symptoms of illness and need for recommended " treatments.   Judgment: Can make reasonable decisions about ordinary activities of daily living and necessary medical care recommendations.            Lab Review:         Clinical Support on 12/28/2023   Component Date Value    WBC 12/28/2023 4.9     nRBC 12/28/2023 0.0     RBC 12/28/2023 4.56     Hemoglobin 12/28/2023 13.9     Hematocrit 12/28/2023 43.3     MCV 12/28/2023 95     MCH 12/28/2023 30.5     MCHC 12/28/2023 32.1     RDW 12/28/2023 12.4     Platelets 12/28/2023 243     Neutrophils % 12/28/2023 50.0     Immature Granulocytes %,* 12/28/2023 0.2     Lymphocytes % 12/28/2023 39.9     Monocytes % 12/28/2023 7.7     Eosinophils % 12/28/2023 1.4     Basophils % 12/28/2023 0.8     Neutrophils Absolute 12/28/2023 2.47     Immature Granulocytes Ab* 12/28/2023 0.01     Lymphocytes Absolute 12/28/2023 1.97     Monocytes Absolute 12/28/2023 0.38     Eosinophils Absolute 12/28/2023 0.07     Basophils Absolute 12/28/2023 0.04    Office Visit on 12/21/2023   Component Date Value    Vitamin D, 25-Hydroxy, T* 12/21/2023 18 (L)     Glucose 12/21/2023 87     Sodium 12/21/2023 143     Potassium 12/21/2023 4.1     Chloride 12/21/2023 104     Bicarbonate 12/21/2023 29     Anion Gap 12/21/2023 14     Urea Nitrogen 12/21/2023 11     Creatinine 12/21/2023 0.68     eGFR 12/21/2023 >90     Calcium 12/21/2023 10.1     Albumin 12/21/2023 4.5     Alkaline Phosphatase 12/21/2023 58     Total Protein 12/21/2023 6.9     AST 12/21/2023 15     Bilirubin, Total 12/21/2023 0.4     ALT 12/21/2023 11     Vitamin B12 12/21/2023 391     Transferrin 12/21/2023 234     Ferritin 12/21/2023 37     Iron 12/21/2023 106     UIBC 12/21/2023 214     TIBC 12/21/2023 320     % Saturation 12/21/2023 33     Thyroid Stimulating Horm* 12/21/2023 0.93             Assessment/Plan  Safety Assessment: no current (+) past SI, (+) SA, no guns. RF include impulsivity, history of self-harm and SA, , pain, depression, chronic illness PF include children, future focused,  hopeful. moderate risk risk     Diagnoses and all orders for this visit: Patient with no signs of clinical depression, anxiety or irritability. Will continue current medications and follow up in September.    Greater than 50% of today's appointment consisted of supportive therapy, counseling, psych-education, and care coordination .   Anxiety associated with depression  Recurrent depression (CMS/HCC)  -     buPROPion (Wellbutrin) 75 mg tablet; Take 1 tablet (75 mg) by mouth once daily.  -     ARIPiprazole (Abilify) 1 mg/mL solution; Take 5 mL (5 mg) by mouth once daily.     Follow up September 28 at 2 pm      Time      2 minutes chart review  20 minutes direct patient contact  10 minutes charting      Total Time 32 minutes

## 2025-04-27 NOTE — PATIENT INSTRUCTIONS
1. Reviewed diagnostic impression including subjective and objective data and provided education about anxiety disorder depression and PTSD , etiology, treatment recommendations including medication, therapy, course of treatment and prognosis. Patient amendable to treatment plan.     2. Continue therapy. No medication changes to recommend. Enjoy your summer and I will see you in September     Recommendations  CONTINUE : 75 mg Bupropion -= take 1 tablet in the morning daily for depression  CONTINUE 5 ml Abilify - take once daily for mood stabilization (5 ml or 1 mg/ml).     3. Reviewed r/b/a, possible side effects of the medication(s). Client is aware benefit/risks      4. Labs reviewed and discussed     5. Plan for supportive psychotherapy     6. Follow up with physical health providers as scheduled     7. Follow up September 28 at 2 pm       May follow up sooner if experiences worsening symptoms by calling  Psychiatry at [phone]      Patient verbalized an understanding to call [facility] at [phone] ([facility]), 211, or 171/go to the nearest emergency room if experiences thoughts of harm to self or others.

## 2025-06-13 ENCOUNTER — TELEPHONE (OUTPATIENT)
Dept: DERMATOLOGY | Facility: CLINIC | Age: 35
End: 2025-06-13
Payer: COMMERCIAL

## 2025-06-13 DIAGNOSIS — L71.9 ROSACEA: Primary | ICD-10-CM

## 2025-06-13 RX ORDER — DOXYCYCLINE 100 MG/1
100 CAPSULE ORAL 2 TIMES DAILY
Qty: 60 CAPSULE | Refills: 1 | Status: SHIPPED | OUTPATIENT
Start: 2025-06-13 | End: 2025-08-12

## 2025-06-13 NOTE — TELEPHONE ENCOUNTER
I sent over doxycycline - please alert patient that may increase her sensitivity to the sun and upset stomach - take 2x daily with food and water x 1 month, repeat for additional month if needed.

## 2025-06-13 NOTE — TELEPHONE ENCOUNTER
Pt LM stating she was seen(3/13/25) for Rosacea, given Metronidazole 0.75% cream. She was instructed to call office if topical was not helping. Pt states Rosacea is still very active.   Per last note-Mid face erythema with telangiectasias and scattered inflammatory papules.     Medication Allergies-Codiene  Other allergies-Shellfish, corn, iodine, latex, dairy, peanut, soy, wheat  Please advise  ThankDaily

## 2025-08-14 ENCOUNTER — OFFICE VISIT (OUTPATIENT)
Dept: PRIMARY CARE | Facility: CLINIC | Age: 35
End: 2025-08-14
Payer: COMMERCIAL

## 2025-08-14 VITALS
DIASTOLIC BLOOD PRESSURE: 80 MMHG | OXYGEN SATURATION: 100 % | SYSTOLIC BLOOD PRESSURE: 123 MMHG | HEIGHT: 69 IN | BODY MASS INDEX: 19.7 KG/M2 | WEIGHT: 133 LBS | TEMPERATURE: 98.8 F | HEART RATE: 83 BPM

## 2025-08-14 DIAGNOSIS — K64.9 HEMORRHOIDS, UNSPECIFIED HEMORRHOID TYPE: Primary | ICD-10-CM

## 2025-08-14 DIAGNOSIS — K62.5 RECTAL BLEEDING: ICD-10-CM

## 2025-08-14 DIAGNOSIS — N39.3 STRESS INCONTINENCE: ICD-10-CM

## 2025-08-14 LAB
ALBUMIN SERPL-MCNC: 4.3 G/DL (ref 3.6–5.1)
ALP SERPL-CCNC: 50 U/L (ref 31–125)
ALT SERPL-CCNC: 13 U/L (ref 6–29)
ANION GAP SERPL CALCULATED.4IONS-SCNC: 7 MMOL/L (CALC) (ref 7–17)
AST SERPL-CCNC: 17 U/L (ref 10–30)
BILIRUB SERPL-MCNC: 0.5 MG/DL (ref 0.2–1.2)
BUN SERPL-MCNC: 9 MG/DL (ref 7–25)
CALCIUM SERPL-MCNC: 9.2 MG/DL (ref 8.6–10.2)
CHLORIDE SERPL-SCNC: 107 MMOL/L (ref 98–110)
CO2 SERPL-SCNC: 26 MMOL/L (ref 20–32)
CREAT SERPL-MCNC: 0.65 MG/DL (ref 0.5–0.97)
EGFRCR SERPLBLD CKD-EPI 2021: 119 ML/MIN/1.73M2
ERYTHROCYTE [DISTWIDTH] IN BLOOD BY AUTOMATED COUNT: 12.1 % (ref 11–15)
GLUCOSE SERPL-MCNC: 86 MG/DL (ref 65–99)
HCT VFR BLD AUTO: 42.9 % (ref 35–45)
HGB BLD-MCNC: 13.8 G/DL (ref 11.7–15.5)
MCH RBC QN AUTO: 29.7 PG (ref 27–33)
MCHC RBC AUTO-ENTMCNC: 32.2 G/DL (ref 32–36)
MCV RBC AUTO: 92.5 FL (ref 80–100)
PLATELET # BLD AUTO: 216 THOUSAND/UL (ref 140–400)
PMV BLD REES-ECKER: 11.1 FL (ref 7.5–12.5)
POC APPEARANCE, URINE: CLEAR
POC BILIRUBIN, URINE: NEGATIVE
POC BLOOD, URINE: NEGATIVE
POC COLOR, URINE: YELLOW
POC GLUCOSE, URINE: NEGATIVE MG/DL
POC KETONES, URINE: NEGATIVE MG/DL
POC LEUKOCYTES, URINE: NEGATIVE
POC NITRITE,URINE: NEGATIVE
POC PH, URINE: 7 PH
POC PROTEIN, URINE: NEGATIVE MG/DL
POC SPECIFIC GRAVITY, URINE: 1.01
POC UROBILINOGEN, URINE: 0.2 EU/DL
POTASSIUM SERPL-SCNC: 4.6 MMOL/L (ref 3.5–5.3)
PROT SERPL-MCNC: 6.6 G/DL (ref 6.1–8.1)
RBC # BLD AUTO: 4.64 MILLION/UL (ref 3.8–5.1)
SODIUM SERPL-SCNC: 140 MMOL/L (ref 135–146)
WBC # BLD AUTO: 6 THOUSAND/UL (ref 3.8–10.8)

## 2025-08-14 PROCEDURE — 99203 OFFICE O/P NEW LOW 30 MIN: CPT | Performed by: NURSE PRACTITIONER

## 2025-08-14 PROCEDURE — 99202 OFFICE O/P NEW SF 15 MIN: CPT

## 2025-08-14 PROCEDURE — 81003 URINALYSIS AUTO W/O SCOPE: CPT | Mod: QW | Performed by: NURSE PRACTITIONER

## 2025-08-14 PROCEDURE — 3008F BODY MASS INDEX DOCD: CPT | Performed by: NURSE PRACTITIONER

## 2025-08-14 ASSESSMENT — ENCOUNTER SYMPTOMS
DEPRESSION: 0
LOSS OF SENSATION IN FEET: 0
OCCASIONAL FEELINGS OF UNSTEADINESS: 0

## 2025-08-14 ASSESSMENT — PAIN SCALES - GENERAL: PAINLEVEL_OUTOF10: 2

## 2025-08-19 ENCOUNTER — RESULTS FOLLOW-UP (OUTPATIENT)
Dept: PRIMARY CARE | Facility: CLINIC | Age: 35
End: 2025-08-19
Payer: COMMERCIAL

## 2025-08-31 ENCOUNTER — APPOINTMENT (OUTPATIENT)
Dept: BEHAVIORAL HEALTH | Facility: CLINIC | Age: 35
End: 2025-08-31
Payer: COMMERCIAL

## 2025-08-31 PROBLEM — F33.2 SEVERE EPISODE OF RECURRENT MAJOR DEPRESSIVE DISORDER, WITHOUT PSYCHOTIC FEATURES (MULTI): Status: RESOLVED | Noted: 2022-12-24 | Resolved: 2025-08-31

## 2025-09-28 ENCOUNTER — APPOINTMENT (OUTPATIENT)
Dept: BEHAVIORAL HEALTH | Facility: CLINIC | Age: 35
End: 2025-09-28
Payer: COMMERCIAL

## 2025-10-02 ENCOUNTER — APPOINTMENT (OUTPATIENT)
Dept: UROLOGY | Facility: CLINIC | Age: 35
End: 2025-10-02
Payer: COMMERCIAL

## 2025-12-21 ENCOUNTER — APPOINTMENT (OUTPATIENT)
Dept: BEHAVIORAL HEALTH | Facility: CLINIC | Age: 35
End: 2025-12-21
Payer: COMMERCIAL

## 2026-01-13 ENCOUNTER — APPOINTMENT (OUTPATIENT)
Dept: PRIMARY CARE | Facility: CLINIC | Age: 36
End: 2026-01-13
Payer: COMMERCIAL

## 2026-03-12 ENCOUNTER — APPOINTMENT (OUTPATIENT)
Dept: DERMATOLOGY | Facility: CLINIC | Age: 36
End: 2026-03-12
Payer: COMMERCIAL